# Patient Record
Sex: FEMALE | NOT HISPANIC OR LATINO | Employment: OTHER | URBAN - METROPOLITAN AREA
[De-identification: names, ages, dates, MRNs, and addresses within clinical notes are randomized per-mention and may not be internally consistent; named-entity substitution may affect disease eponyms.]

---

## 2024-04-23 ENCOUNTER — TRANSFERRED RECORDS (OUTPATIENT)
Dept: HEALTH INFORMATION MANAGEMENT | Facility: CLINIC | Age: 74
End: 2024-04-23

## 2024-05-10 ENCOUNTER — TRANSFERRED RECORDS (OUTPATIENT)
Dept: HEALTH INFORMATION MANAGEMENT | Facility: CLINIC | Age: 74
End: 2024-05-10

## 2024-05-13 ENCOUNTER — TRANSFERRED RECORDS (OUTPATIENT)
Dept: HEALTH INFORMATION MANAGEMENT | Facility: CLINIC | Age: 74
End: 2024-05-13

## 2024-07-16 ENCOUNTER — TRANSFERRED RECORDS (OUTPATIENT)
Dept: HEALTH INFORMATION MANAGEMENT | Facility: CLINIC | Age: 74
End: 2024-07-16

## 2024-07-22 ENCOUNTER — TELEPHONE (OUTPATIENT)
Dept: OPHTHALMOLOGY | Facility: CLINIC | Age: 74
End: 2024-07-22

## 2024-07-22 NOTE — TELEPHONE ENCOUNTER
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 Provided appointment details over the phone.

## 2024-07-23 ENCOUNTER — PRE VISIT (OUTPATIENT)
Dept: OPHTHALMOLOGY | Facility: CLINIC | Age: 74
End: 2024-07-23

## 2024-07-23 ENCOUNTER — OFFICE VISIT (OUTPATIENT)
Dept: OPHTHALMOLOGY | Facility: CLINIC | Age: 74
End: 2024-07-23

## 2024-07-23 DIAGNOSIS — C69.31 CHOROID MELANOMA OF RIGHT EYE (H): Primary | ICD-10-CM

## 2024-07-23 PROCEDURE — 99204 OFFICE O/P NEW MOD 45 MIN: CPT | Mod: GC | Performed by: OPHTHALMOLOGY

## 2024-07-23 RX ORDER — LEVOTHYROXINE SODIUM 112 UG/1
1 TABLET ORAL
COMMUNITY
Start: 2024-04-17

## 2024-07-23 ASSESSMENT — TONOMETRY
OD_IOP_MMHG: 9
IOP_METHOD: ICARE
OS_IOP_MMHG: 11

## 2024-07-23 ASSESSMENT — CONF VISUAL FIELD
OD_SUPERIOR_NASAL_RESTRICTION: 3
OS_SUPERIOR_TEMPORAL_RESTRICTION: 0
OD_INFERIOR_NASAL_RESTRICTION: 3
OS_INFERIOR_NASAL_RESTRICTION: 0
OS_SUPERIOR_NASAL_RESTRICTION: 0
OS_NORMAL: 1
METHOD: COUNTING FINGERS
OS_INFERIOR_TEMPORAL_RESTRICTION: 0

## 2024-07-23 ASSESSMENT — VISUAL ACUITY
OD_CC: 20/40
OS_CC: 20/50
METHOD: SNELLEN - LINEAR
OD_CC+: -2

## 2024-07-23 ASSESSMENT — EXTERNAL EXAM - LEFT EYE: OS_EXAM: NORMAL

## 2024-07-23 ASSESSMENT — SLIT LAMP EXAM - LIDS
COMMENTS: NORMAL
COMMENTS: NORMAL

## 2024-07-23 ASSESSMENT — EXTERNAL EXAM - RIGHT EYE: OD_EXAM: NORMAL

## 2024-07-23 NOTE — TELEPHONE ENCOUNTER
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 Per email from referring provider    RECORDS REQUESTED FROM:       Clinic name Comments Records Status Imaging Status   [facility] Urgent request for recs sent 7/23- received emailed to nurse/sent to scanning EPIC

## 2024-07-23 NOTE — LETTER
2024         RE:  :  MRN: Shira Abernathy  1950  [id number]     Dear Dr. Shane Aj,    Thank you for asking me to see your patient, Shira Abernathy, for an oculoplastic   consultation.  My assessment and plan are below.  For further details, please see my attached clinic note.    Oculoplastic Clinic New Patient     Patient: Shira Abernathy MRN# [id number]   YOB: 1950 Age: 73 year old   Date of Visit: 2024     CC: Blind painful eye     Chief Complaint(s) and History of Present Illness(es)     Malignant Melanoma (Choroid) Evaluation            Laterality: right eye    Duration: 1 month    Course: gradually worsening           Comments    Did not notice changes in vision in right eye.  Was seen for yearly eye   exam and was noted to have choroidal lesion concerning for melanoma.  Has   water circles that flash in the left eye.  Denies eye pain.  No history of   cancer elsewhere.  History of monovision LASIK with distance right eye and   near left eye.       Rita Hare on 2024 at 1:39 PM             HPI:      Shira Abernathy is a 73 year old female who is referred for enucleation right eye for suspected choroidal melanoma in the right eye.   Vision is 20/40 in the right eye.            PAST OCULAR HISTORY:      Suspected choroidal melanoma in the right eye  ERM each eye   PVD each eye   Dry eye   LASIK each eye           Assessment and Plan:      1. Choroid melanoma of right eye (H)       Enucleation with placement of silicone implant RIGHT EYE for choroidal melanoma  Interested in [place] testing.     Patient has concerns about undergoing general anesthesia given prolonged time waking up from anesthesia and risks of cognitive impairment.     We discussed the typical postoperative care, including the need for a patch for 4-6 days. After this she will have a clear conformer in. About 8 weeks later I will see her back and she can schedule an appointment with  the  to make a prosthesis any time after that.      ANTICOAGULATION:  None     Can hold prior to surgery. Enucleation with silicone implant.   Sylvia Drake,  Oculoplastic Surgery Fellow       Again, thank you for allowing me to participate in the care of your patient.      Sincerely,    Shaggy Aguilar MD  Department of Ophthalmology and Visual Neurosciences  [facility]    CC: Shane Aj MD  [facility]  [address] [place] [address] [address]  [facility] [address]  Via In Basket

## 2024-07-23 NOTE — PROGRESS NOTES
"Oculoplastic Clinic New Patient    Patient: Shira Abernathy MRN# [id number]   YOB: 1950 Age: 73 year old   Date of Visit: Jul 23, 2024    CC: Blind painful eye    Chief Complaint(s) and History of Present Illness(es)     Malignant Melanoma (Choroid) Evaluation            Laterality: right eye    Duration: 1 month    Course: gradually worsening           Comments    Did not notice changes in vision in right eye.  Was seen for yearly eye   exam and was noted to have choroidal lesion concerning for melanoma.  Has   water circles that flash in the left eye.  Denies eye pain.  No history of   cancer elsewhere.  History of monovision LASIK with distance right eye and   near left eye.      Rita Hare on 7/23/2024 at 1:39 PM             HPI:     Shira Abernathy is a 73 year old female who is referred for enucleation right eye for suspected choroidal melanoma in the right eye.   Vision is 20/40 in the right eye.            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 Choroid melanoma of right eye (H)      Enucleation with placement of silicone implant RIGHT EYE for choroidal melanoma  Interested in [place] testing.    Patient has concerns about undergoing general anesthesia given prolonged time waking up from anesthesia and risks of cognitive impairment.    We discussed the typical postoperative care, including the need for a patch for 4-6 days. After this she will have a clear conformer in. About 8 weeks later I will see her back and she can schedule an appointment with the  to make a prosthesis any time after that.     We discussed she will need to see oncology locally ([place]).   She has had imaging earlier including Chest/Abdomen CT. I don't have those reports, but they report it was \"clean.\" They do understand even despite this there is a risk of metastasis " even with enucleation.        ANTICOAGULATION:  None    Can hold prior to surgery. Enucleation with silicone implant.      Sylvia Drake,  Oculoplastic Surgery Fellow    Attending Physician Attestation: Complete documentation of historical and exam elements from today's encounter can be found in the full encounter summary report (not reduplicated in this progress note). I personally obtained the chief complaint(s) and history of present illness. I confirmed and edited as necessary the review of systems, past medical/surgical history, family history, social history, and examination findings as documented by others; and I examined the patient myself. I personally reviewed the relevant tests, images, and reports as documented above. I formulated and edited as necessary the assessment and plan and discussed the findings and management plan with the patient. Shaggy Aguilar MD    Today with Shira Abernathy I reviewed the indications, risks, benefits, and alternatives of the proposed surgical procedure including, but not limited to, failure obtain the desired result and need for additional surgery, bleeding, infection, and the remote possibility of permanent damage to any organ system or death with the use of anesthesia. With implants, there is always a risk of implant related complications including exposure, extrusion, infection, and need for more surgery or medications. I provided multiple opportunities for the questions, answered all questions to the best of my ability, and confirmed that my answers and my discussion were understood.

## 2024-07-23 NOTE — NURSING NOTE
Chief Complaints and History of Present Illnesses   Patient presents with    Malignant Melanoma (Choroid) Evaluation     Chief Complaint(s) and History of Present Illness(es)       Malignant Melanoma (Choroid) Evaluation              Laterality: right eye    Duration: 1 month    Course: gradually worsening              Comments    Did not notice changes in vision in right eye.  Was seen for yearly eye exam and was noted to have choroidal lesion concerning for melanoma.  Has water circles that flash in the left eye.  Denies eye pain.  No history of cancer elsewhere.  History of monovision LASIK with distance right eye and near left eye.      Rita Hare on 7/23/2024 at 1:39 PM

## 2024-07-25 ENCOUNTER — TELEPHONE (OUTPATIENT)
Dept: OPHTHALMOLOGY | Facility: CLINIC | Age: 74
End: 2024-07-25

## 2024-07-25 NOTE — TELEPHONE ENCOUNTER
Message left for the patient to call back to get surgery scheduled with Dr. Aguilar.     Divina Marks  Surgery Scheduling Coordinator  Ph: [phone]

## 2024-07-26 NOTE — TELEPHONE ENCOUNTER
Called patient to schedule surgery with Dr. Aguilar    Spoke with: Romaine ()    Date of Surgery: 8-2-24    Patient aware of approximate arrival time: No at pt to get a call a couple of days prior to surgery     Location of surgery: [facility] OR      Pre-Op H&P: Primary Care Clinic at [facility] or [facility]?     Informed patient that they need to call to schedule pre-op H&P with PCP within 30 days of surgery date: Yes      Post-Op Appt Dates: 8-7-24 11:00am [place]       Discussed with patient pre-op RN will call 2-3 days prior to surgery with arrival time and instructions:  Yes       Standard Surgery Packet Sent: Yes 07/26/24  via Mail - Standard      Surgical Soap Discussed with Patient: Yes  - Received:  Local Pharmacy       Additional Information Sent in Packet:          Informed patient that they will need an adult  to bring patient home from surgery: Yes  : - Romaine         Additional Comments:        All patients questions were answered and was instructed to review surgical packet and call back [phone] with any questions or concerns.       Divina Marks on 7/26/2024 at 11:05 AM

## 2024-07-31 ENCOUNTER — TRANSFERRED RECORDS (OUTPATIENT)
Dept: HEALTH INFORMATION MANAGEMENT | Facility: CLINIC | Age: 74
End: 2024-07-31

## 2024-07-31 ENCOUNTER — TELEPHONE (OUTPATIENT)
Dept: OPHTHALMOLOGY | Facility: CLINIC | Age: 74
End: 2024-07-31

## 2024-07-31 ENCOUNTER — TRANSFERRED RECORDS (OUTPATIENT)
Dept: MULTI SPECIALTY CLINIC | Facility: CLINIC | Age: 74
End: 2024-07-31

## 2024-07-31 LAB
CREATININE (EXTERNAL): 0.79 MG/DL (ref 0.7–1.2)
GFR ESTIMATED (EXTERNAL): 78 ML/MIN/1.73M2
GLUCOSE (EXTERNAL): 81 MG/DL (ref 70–100)
POTASSIUM (EXTERNAL): 4.3 MMOL/L (ref 3.5–5.1)

## 2024-07-31 NOTE — TELEPHONE ENCOUNTER
LVM with [facility] that [facility] Care form has been sent and they can either fax or email it back.  Left my number in case there are any questions.    Reinaldo Vital  Clinic Manager

## 2024-08-02 ENCOUNTER — ANESTHESIA (OUTPATIENT)
Dept: SURGERY | Facility: CLINIC | Age: 74
End: 2024-08-02

## 2024-08-02 ENCOUNTER — HOSPITAL ENCOUNTER (OUTPATIENT)
Facility: CLINIC | Age: 74
Discharge: HOME | End: 2024-08-02
Attending: OPHTHALMOLOGY | Admitting: OPHTHALMOLOGY

## 2024-08-02 ENCOUNTER — ANESTHESIA EVENT (OUTPATIENT)
Dept: SURGERY | Facility: CLINIC | Age: 74
End: 2024-08-02

## 2024-08-02 ENCOUNTER — TRANSFERRED RECORDS (OUTPATIENT)
Dept: HEALTH INFORMATION MANAGEMENT | Facility: CLINIC | Age: 74
End: 2024-08-02

## 2024-08-02 VITALS
DIASTOLIC BLOOD PRESSURE: 73 MMHG | RESPIRATION RATE: 16 BRPM | HEIGHT: 66 IN | BODY MASS INDEX: 22.16 KG/M2 | WEIGHT: 137.9 LBS | OXYGEN SATURATION: 96 % | TEMPERATURE: 97.2 F | SYSTOLIC BLOOD PRESSURE: 122 MMHG | HEART RATE: 63 BPM

## 2024-08-02 DIAGNOSIS — Z98.890 POSTOPERATIVE EYE STATE: Primary | ICD-10-CM

## 2024-08-02 PROCEDURE — 710N000009 HC RECOVERY PHASE 1, LEVEL 1, PER MIN: Performed by: OPHTHALMOLOGY

## 2024-08-02 PROCEDURE — 258N000003 HC RX IP 258 OP 636: Performed by: ANESTHESIOLOGY

## 2024-08-02 PROCEDURE — 250N000009 HC RX 250: Performed by: ANESTHESIOLOGY

## 2024-08-02 PROCEDURE — 65105 REMOVE EYE/ATTACH IMPLANT: CPT | Mod: RT | Performed by: OPHTHALMOLOGY

## 2024-08-02 PROCEDURE — 360N000076 HC SURGERY LEVEL 3, PER MIN: Performed by: OPHTHALMOLOGY

## 2024-08-02 PROCEDURE — 250N000009 HC RX 250: Performed by: OPHTHALMOLOGY

## 2024-08-02 PROCEDURE — 250N000013 HC RX MED GY IP 250 OP 250 PS 637: Performed by: OPHTHALMOLOGY

## 2024-08-02 PROCEDURE — 88313 SPECIAL STAINS GROUP 2: CPT | Mod: 26 | Performed by: OPHTHALMOLOGY

## 2024-08-02 PROCEDURE — 258N000003 HC RX IP 258 OP 636: Performed by: OPHTHALMOLOGY

## 2024-08-02 PROCEDURE — 65105 REMOVE EYE/ATTACH IMPLANT: CPT | Performed by: ANESTHESIOLOGY

## 2024-08-02 PROCEDURE — 272N000001 HC OR GENERAL SUPPLY STERILE: Performed by: OPHTHALMOLOGY

## 2024-08-02 PROCEDURE — 250N000011 HC RX IP 250 OP 636

## 2024-08-02 PROCEDURE — 710N000012 HC RECOVERY PHASE 2, PER MINUTE: Performed by: OPHTHALMOLOGY

## 2024-08-02 PROCEDURE — 999N000141 HC STATISTIC PRE-PROCEDURE NURSING ASSESSMENT: Performed by: OPHTHALMOLOGY

## 2024-08-02 PROCEDURE — 99100 ANES PT EXTEME AGE<1 YR&>70: CPT

## 2024-08-02 PROCEDURE — 250N000011 HC RX IP 250 OP 636: Performed by: OPHTHALMOLOGY

## 2024-08-02 PROCEDURE — 250N000025 HC SEVOFLURANE, PER MIN: Performed by: OPHTHALMOLOGY

## 2024-08-02 PROCEDURE — 67875 CLOSURE OF EYELID BY SUTURE: CPT | Mod: RT | Performed by: OPHTHALMOLOGY

## 2024-08-02 PROCEDURE — L8610 OCULAR IMPLANT: HCPCS | Performed by: OPHTHALMOLOGY

## 2024-08-02 PROCEDURE — 250N000011 HC RX IP 250 OP 636: Performed by: ANESTHESIOLOGY

## 2024-08-02 PROCEDURE — 65105 REMOVE EYE/ATTACH IMPLANT: CPT

## 2024-08-02 PROCEDURE — 88307 TISSUE EXAM BY PATHOLOGIST: CPT | Mod: 26 | Performed by: OPHTHALMOLOGY

## 2024-08-02 PROCEDURE — 88313 SPECIAL STAINS GROUP 2: CPT | Mod: TC | Performed by: OPHTHALMOLOGY

## 2024-08-02 PROCEDURE — 370N000017 HC ANESTHESIA TECHNICAL FEE, PER MIN: Performed by: OPHTHALMOLOGY

## 2024-08-02 DEVICE — EYE IMP SPHERE SILICONE 22MM S6.1022U: Type: IMPLANTABLE DEVICE | Site: EYE | Status: FUNCTIONAL

## 2024-08-02 RX ORDER — ONDANSETRON 4 MG/1
4 TABLET, ORALLY DISINTEGRATING ORAL EVERY 30 MIN PRN
Status: DISCONTINUED | OUTPATIENT
Start: 2024-08-02 | End: 2024-08-02 | Stop reason: HOSPADM

## 2024-08-02 RX ORDER — FENTANYL CITRATE 50 UG/ML
INJECTION, SOLUTION INTRAMUSCULAR; INTRAVENOUS PRN
Status: DISCONTINUED | OUTPATIENT
Start: 2024-08-02 | End: 2024-08-02

## 2024-08-02 RX ORDER — SODIUM CHLORIDE, SODIUM LACTATE, POTASSIUM CHLORIDE, CALCIUM CHLORIDE 600; 310; 30; 20 MG/100ML; MG/100ML; MG/100ML; MG/100ML
INJECTION, SOLUTION INTRAVENOUS CONTINUOUS
Status: DISCONTINUED | OUTPATIENT
Start: 2024-08-02 | End: 2024-08-02 | Stop reason: HOSPADM

## 2024-08-02 RX ORDER — OXYCODONE HYDROCHLORIDE 5 MG/1
5 TABLET ORAL ONCE
Status: COMPLETED | OUTPATIENT
Start: 2024-08-02 | End: 2024-08-02

## 2024-08-02 RX ORDER — LIDOCAINE HYDROCHLORIDE 20 MG/ML
INJECTION, SOLUTION INFILTRATION; PERINEURAL PRN
Status: DISCONTINUED | OUTPATIENT
Start: 2024-08-02 | End: 2024-08-02

## 2024-08-02 RX ORDER — DEXAMETHASONE SODIUM PHOSPHATE 4 MG/ML
4 INJECTION, SOLUTION INTRA-ARTICULAR; INTRALESIONAL; INTRAMUSCULAR; INTRAVENOUS; SOFT TISSUE
Status: DISCONTINUED | OUTPATIENT
Start: 2024-08-02 | End: 2024-08-02 | Stop reason: HOSPADM

## 2024-08-02 RX ORDER — ONDANSETRON 2 MG/ML
INJECTION INTRAMUSCULAR; INTRAVENOUS PRN
Status: DISCONTINUED | OUTPATIENT
Start: 2024-08-02 | End: 2024-08-02

## 2024-08-02 RX ORDER — CEFAZOLIN SODIUM/WATER 2 G/20 ML
2 SYRINGE (ML) INTRAVENOUS SEE ADMIN INSTRUCTIONS
Status: DISCONTINUED | OUTPATIENT
Start: 2024-08-02 | End: 2024-08-02 | Stop reason: HOSPADM

## 2024-08-02 RX ORDER — ERYTHROMYCIN 5 MG/G
OINTMENT OPHTHALMIC PRN
Status: DISCONTINUED | OUTPATIENT
Start: 2024-08-02 | End: 2024-08-02 | Stop reason: HOSPADM

## 2024-08-02 RX ORDER — EPHEDRINE SULFATE 50 MG/ML
INJECTION, SOLUTION INTRAMUSCULAR; INTRAVENOUS; SUBCUTANEOUS PRN
Status: DISCONTINUED | OUTPATIENT
Start: 2024-08-02 | End: 2024-08-02

## 2024-08-02 RX ORDER — LIDOCAINE HCL/EPINEPHRINE/PF 2%-1:200K
VIAL (ML) INJECTION
Status: DISCONTINUED
Start: 2024-08-02 | End: 2024-08-02 | Stop reason: HOSPADM

## 2024-08-02 RX ORDER — GLYCOPYRROLATE 0.2 MG/ML
INJECTION, SOLUTION INTRAMUSCULAR; INTRAVENOUS PRN
Status: DISCONTINUED | OUTPATIENT
Start: 2024-08-02 | End: 2024-08-02

## 2024-08-02 RX ORDER — PROPOFOL 10 MG/ML
INJECTION, EMULSION INTRAVENOUS PRN
Status: DISCONTINUED | OUTPATIENT
Start: 2024-08-02 | End: 2024-08-02

## 2024-08-02 RX ORDER — HYDROMORPHONE HCL IN WATER/PF 6 MG/30 ML
0.4 PATIENT CONTROLLED ANALGESIA SYRINGE INTRAVENOUS EVERY 5 MIN PRN
Status: DISCONTINUED | OUTPATIENT
Start: 2024-08-02 | End: 2024-08-02 | Stop reason: HOSPADM

## 2024-08-02 RX ORDER — FENTANYL CITRATE 0.05 MG/ML
25 INJECTION, SOLUTION INTRAMUSCULAR; INTRAVENOUS EVERY 5 MIN PRN
Status: DISCONTINUED | OUTPATIENT
Start: 2024-08-02 | End: 2024-08-02 | Stop reason: HOSPADM

## 2024-08-02 RX ORDER — NALOXONE HYDROCHLORIDE 0.4 MG/ML
0.1 INJECTION, SOLUTION INTRAMUSCULAR; INTRAVENOUS; SUBCUTANEOUS
Status: DISCONTINUED | OUTPATIENT
Start: 2024-08-02 | End: 2024-08-02 | Stop reason: HOSPADM

## 2024-08-02 RX ORDER — DOXYCYCLINE 100 MG/1
100 CAPSULE ORAL 2 TIMES DAILY
Qty: 14 CAPSULE | Refills: 0 | Status: SHIPPED | OUTPATIENT
Start: 2024-08-02 | End: 2024-08-09

## 2024-08-02 RX ORDER — MAGNESIUM HYDROXIDE 1200 MG/15ML
LIQUID ORAL PRN
Status: DISCONTINUED | OUTPATIENT
Start: 2024-08-02 | End: 2024-08-02 | Stop reason: HOSPADM

## 2024-08-02 RX ORDER — TRANEXAMIC ACID 10 MG/ML
1 INJECTION, SOLUTION INTRAVENOUS ONCE
Status: DISCONTINUED | OUTPATIENT
Start: 2024-08-02 | End: 2024-08-02 | Stop reason: HOSPADM

## 2024-08-02 RX ORDER — NEOMYCIN SULFATE, POLYMYXIN B SULFATE, AND DEXAMETHASONE 3.5; 10000; 1 MG/G; [USP'U]/G; MG/G
OINTMENT OPHTHALMIC
Qty: 3.5 G | Refills: 1 | Status: SHIPPED | OUTPATIENT
Start: 2024-08-02 | End: 2024-10-02

## 2024-08-02 RX ORDER — HYDROMORPHONE HCL IN WATER/PF 6 MG/30 ML
0.2 PATIENT CONTROLLED ANALGESIA SYRINGE INTRAVENOUS EVERY 5 MIN PRN
Status: DISCONTINUED | OUTPATIENT
Start: 2024-08-02 | End: 2024-08-02 | Stop reason: HOSPADM

## 2024-08-02 RX ORDER — FENTANYL CITRATE 0.05 MG/ML
50 INJECTION, SOLUTION INTRAMUSCULAR; INTRAVENOUS EVERY 5 MIN PRN
Status: DISCONTINUED | OUTPATIENT
Start: 2024-08-02 | End: 2024-08-02 | Stop reason: HOSPADM

## 2024-08-02 RX ORDER — SODIUM CHLORIDE, SODIUM LACTATE, POTASSIUM CHLORIDE, CALCIUM CHLORIDE 600; 310; 30; 20 MG/100ML; MG/100ML; MG/100ML; MG/100ML
INJECTION, SOLUTION INTRAVENOUS CONTINUOUS PRN
Status: DISCONTINUED | OUTPATIENT
Start: 2024-08-02 | End: 2024-08-02

## 2024-08-02 RX ORDER — ONDANSETRON 2 MG/ML
4 INJECTION INTRAMUSCULAR; INTRAVENOUS EVERY 30 MIN PRN
Status: DISCONTINUED | OUTPATIENT
Start: 2024-08-02 | End: 2024-08-02 | Stop reason: HOSPADM

## 2024-08-02 RX ORDER — DEXAMETHASONE SODIUM PHOSPHATE 4 MG/ML
INJECTION, SOLUTION INTRA-ARTICULAR; INTRALESIONAL; INTRAMUSCULAR; INTRAVENOUS; SOFT TISSUE PRN
Status: DISCONTINUED | OUTPATIENT
Start: 2024-08-02 | End: 2024-08-02

## 2024-08-02 RX ORDER — ERYTHROMYCIN 5 MG/G
OINTMENT OPHTHALMIC
Status: DISCONTINUED
Start: 2024-08-02 | End: 2024-08-02 | Stop reason: HOSPADM

## 2024-08-02 RX ORDER — CEFAZOLIN SODIUM/WATER 2 G/20 ML
2 SYRINGE (ML) INTRAVENOUS
Status: COMPLETED | OUTPATIENT
Start: 2024-08-02 | End: 2024-08-02

## 2024-08-02 RX ORDER — BUPIVACAINE HYDROCHLORIDE 5 MG/ML
INJECTION, SOLUTION EPIDURAL; INTRACAUDAL
Status: DISCONTINUED
Start: 2024-08-02 | End: 2024-08-02 | Stop reason: HOSPADM

## 2024-08-02 RX ORDER — OXYCODONE HYDROCHLORIDE 5 MG/1
5 TABLET ORAL EVERY 6 HOURS PRN
Qty: 12 TABLET | Refills: 0 | Status: SHIPPED | OUTPATIENT
Start: 2024-08-02 | End: 2024-08-05

## 2024-08-02 RX ADMIN — LIDOCAINE HYDROCHLORIDE 100 MG: 20 INJECTION, SOLUTION INFILTRATION; PERINEURAL at 10:57

## 2024-08-02 RX ADMIN — DEXAMETHASONE SODIUM PHOSPHATE 4 MG: 4 INJECTION, SOLUTION INTRA-ARTICULAR; INTRALESIONAL; INTRAMUSCULAR; INTRAVENOUS; SOFT TISSUE at 11:07

## 2024-08-02 RX ADMIN — OXYCODONE HYDROCHLORIDE 5 MG: 5 TABLET ORAL at 13:30

## 2024-08-02 RX ADMIN — Medication 2 G: at 10:54

## 2024-08-02 RX ADMIN — ONDANSETRON 4 MG: 2 INJECTION INTRAMUSCULAR; INTRAVENOUS at 11:07

## 2024-08-02 RX ADMIN — SODIUM CHLORIDE, POTASSIUM CHLORIDE, SODIUM LACTATE AND CALCIUM CHLORIDE: 600; 310; 30; 20 INJECTION, SOLUTION INTRAVENOUS at 10:54

## 2024-08-02 RX ADMIN — PROPOFOL 150 MG: 10 INJECTION, EMULSION INTRAVENOUS at 10:57

## 2024-08-02 RX ADMIN — GLYCOPYRROLATE 0.2 MG: 0.2 INJECTION, SOLUTION INTRAMUSCULAR; INTRAVENOUS at 11:29

## 2024-08-02 RX ADMIN — TRANEXAMIC ACID 1 G: 1 INJECTION, SOLUTION INTRAVENOUS at 11:04

## 2024-08-02 RX ADMIN — Medication 10 MG: at 11:10

## 2024-08-02 RX ADMIN — Medication 5 MG: at 11:06

## 2024-08-02 RX ADMIN — FENTANYL CITRATE 100 MCG: 50 INJECTION INTRAMUSCULAR; INTRAVENOUS at 10:57

## 2024-08-02 RX ADMIN — PHENYLEPHRINE HYDROCHLORIDE 0.2 MCG/KG/MIN: 10 INJECTION INTRAVENOUS at 11:06

## 2024-08-02 RX ADMIN — SODIUM CHLORIDE, POTASSIUM CHLORIDE, SODIUM LACTATE AND CALCIUM CHLORIDE: 600; 310; 30; 20 INJECTION, SOLUTION INTRAVENOUS at 10:14

## 2024-08-02 ASSESSMENT — ACTIVITIES OF DAILY LIVING (ADL)
ADLS_ACUITY_SCORE: 35

## 2024-08-02 ASSESSMENT — LIFESTYLE VARIABLES: TOBACCO_USE: 0

## 2024-08-02 NOTE — OP NOTE
PREOPERATIVE DIAGNOSIS: Presumed choroidal melanoma right eye   POSTOPERATIVE DIAGNOSIS: Presumed choroidal melanoma right eye  PROCEDURE: Right eye enucleation with placement of a 22 mm silicone implant with the muscles attached. Temporary tarsorrhaphy.   SURGEON: Shaggy Aguilar MD   ASSISTANT: Sylvia Drake MD  ANESTHESIA: General anesthesia.   COMPLICATIONS: None.   SPECIMEN:Right  eye in formalin for pathologic evaluation.   ESTIMATED BLOOD LOSS: Less than 10 mL.   HISTORY AND INDICATIONS: Shira Abernathy  presented with a presumed choroidal melanoma of the right eye. After the risks, benefits and alternatives to the proposed procedure were explained, informed consent was obtained.   DESCRIPTION OF PROCEDURE: Shira Abernathy  was brought to the operating room and placed supine on the operating table. General anesthesia was induced. The right  eye was prepped and draped in the typical sterile ophthalmic fashion. Attention was directed to the right  side. A Stewart lid speculum was placed to separate the eyelids. A 360-degree conjunctival peritomy was performed with the [place] scissors. Dissection was carried into the quadrants with the Balderrama tenotomy scissors. Each rectus muscle was hooked with the Yoav muscle hook,  tagged with a double-armed 5-0 Prolene suture and cut free from the globe. The inferior oblique muscle was identified, cauterized and cut free from the globe. The optic nerve was clamped with a hemostat and severed with the enucleation scissors. The hemostasis was obtained with pressure and bipolar cautery. The implant sizer spheres were used to determine the correct implant size and a 22 mm implant was selected. The  implant was placed in the antibiotic solution and then the incisions were made in the posterior portion of the sclera. The implant was placed in the socket using the Florin sphere introducer. The rectus muscles were sutured to the implant. Tenon's layer was closed with  interrupted buried 5-0 Monocryl sutures. The conjunctiva was closed with running 6-0 plain gut suture. A temporary tarsorrhaphy was fashioned with a 5-0 Monocryl laterally. Antibiotic ointment, a conformer and a pressure patch were placed. The patient tolerated the procedure well and  left the operating room in stable condition after being awoken from general anesthesia.   Shaggy Aguilar MD

## 2024-08-02 NOTE — ANESTHESIA PROCEDURE NOTES
Airway       Patient location during procedure: OR  Staff -        Anesthesiologist:  Samson Lamb MD       CRNA: Marilin Celis APRN CRNA       Other Anesthesia Staff: Walt Bocanegra       Performed By: TOM and with CRNAs       Procedure performed by resident/fellow/CRNA in presence of a teaching physician.    Consent for Airway        Urgency: elective  Indications and Patient Condition       Indications for airway management: etelvina-procedural       Induction type:intravenous       Mask difficulty assessment: 0 - not attempted    Final Airway Details       Final airway type: supraglottic airway    Supraglottic Airway Details        Type: LMA       Brand: I-Gel       LMA size: 4    Post intubation assessment        Placement verified by: capnometry, equal breath sounds and chest rise        Number of attempts at approach: 1       Number of other approaches attempted: 0       Secured with: tape       Ease of procedure: easy       Dentition: Intact and Unchanged

## 2024-08-02 NOTE — DISCHARGE INSTRUCTIONS
Post-operative Instructions - Enucleation and Evisceration   Ophthalmic Plastic and Reconstructive Surgery    Shaggy Aguilar M.D.    All instructions apply to the operated eye(s) or eyelid(s).    Wound care and personal care  ? Leave the dressing in place until seen in clinic. Ensure that the bandage does not get wet when you take a shower.  ? Warm soaks or warm packs should be used over the operated side four times daily after the dressing has been removed.    ? You may shower the day after surgery but do not get the dressing wet. Do not bathe for 1 week to prevent contamination of your wounds. Do not submerge your head in water (swimming, in a tub) for six weeks.  ? Do not apply make-up to the eyes or eyelids for 2 weeks after surgery.  ? Expect some swelling, bruising, black eye (even into the lower eyelids and cheeks). Also expect serum caking, crusting and discharge from the eye and/or incisions. A small amount of surface bleeding is normal for the first 48 hours.  ? Your eye(s) and eyelid(s) may be painful and tender. This is normal after surgery. You will have pain with eye movements.   Use the pain medication as prescribed.    Follow-up  ? Return to the Eye Clinic for a follow-up appointment with your physician as  scheduled. If no appointment has been scheduled, you should be seen within 4-7 days to have the dressing removed.    Activity restrictions and driving  ? Avoid heavy lifting (over 15 pounds), bending, exercise or strenuous activity for 1 week after surgery.  You may resume other activities and return to work as tolerated.  ? You may not resume driving until have you stopped using narcotic pain medications (such as Norco, Oxycodone, Percocet, Tylenol #3).    Medications  ? Restart all your regular home medications. If you were using eye drops in the operated eye, you can stop those. If you take Plavix or Aspirin on a regular basis, wait for 72 hours after your surgery before restarting these in  order to decrease the risk of bleeding complications.  ? Avoid aspirin and aspirin-like medications (Motrin, Aleve, Ibuprofen, [place] etc) for 72 hours to reduce the risk of bleeding. You may take Tylenol  (acetaminophen) for pain.  ? In addition to your home medications, take the following post-operative medications as prescribed by your physician.    ? Take antibiotic capsules or tablets as directed.  ? Take pain pills at prescribed frequency as needed for pain.  ? Once your dressing has been removed, you should apply the prescribed antibiotic ointment to the operated eye socket three times a day.    ? WARNING: Some prescription pain medications contain Tylenol (acetaminophen). You must not take more than 4,000 mg of acetaminophen per  24-hour period. This is equivalent to 12 tablets of Norco, Percocet or Tylenol #3. If you take other over-the-counter medications containing acetaminophen, you must take the amount of acetaminophen into account and reduce the number of prescribed pain pills accordingly.  ? The prescribed medications may make you drowsy. You must not drive a car, operate heavy machinery or drink alcohol while taking them.  ? The prescribed pain medications may cause constipation and nausea. Take them with some food to prevent a stomach upset. If you continue to experience nausea, call your physician.    Contact Information:  If you have any problems, concerns, or need to schedule follow up please call the clinic:    If your clinic appointments (regardless of where surgery was performed) are at the [facility]: [phone]     An on call person can be reached after hours for concerns. The on call doctor should not call in medication refill requests after hours or on weekends, so please plan accordingly. An effort has been made to provide adequate pain medications following every surgery, and refills will not be provided in most instances.       Same Day Surgery Discharge  Instructions for  Sedation and General Anesthesia     It's not unusual to feel dizzy, light-headed or faint for up to 24 hours after surgery or while taking pain medication.  If you have these symptoms: sit for a few minutes before standing and have someone assist you when you get up to walk or use the bathroom.    You should rest and relax for the next 24 hours. We recommend you make arrangements to have an adult stay with you for at least 24 hours after your discharge.  Avoid hazardous and strenuous activity.    DO NOT DRIVE any vehicle or operate mechanical equipment for 24 hours following the end of your surgery.  Even though you may feel normal, your reactions may be affected by the medication you have received.    Do not drink alcoholic beverages for 24 hours following surgery.     Slowly progress to your regular diet as you feel able. It's not unusual to feel nauseated and/or vomit after receiving anesthesia.  If you develop these symptoms, drink clear liquids (apple juice, ginger ale, broth, 7-up, etc. ) until you feel better.  If your nausea and vomiting persists for 24 hours, please notify your surgeon.      All narcotic pain medications, along with inactivity and anesthesia, can cause constipation. Drinking plenty of liquids and increasing fiber intake will help.    For any questions of a medical nature, call your surgeon.    Do not make important decisions for 24 hours.    If you had general anesthesia, you may have a sore throat for a couple of days related to the breathing tube used during surgery.  You may use Cepacol lozenges to help with this discomfort.  If it worsens or if you develop a fever, contact your surgeon.     If you feel your pain is not well managed with the pain medications prescribed by your surgeon, please contact your surgeon's office to let them know so they can address your concerns.

## 2024-08-02 NOTE — BRIEF OP NOTE
[facility]    Brief Operative Note    Pre-operative diagnosis: Choroid melanoma of right eye (H) [C69.31]  Post-operative diagnosis Same as pre-operative diagnosis    Procedure: Right eye enucleation with implant, Right - Eye    Surgeon: Surgeons and Role:     * Shaggy Aguilar MD - Primary  Anesthesia: General   Estimated Blood Loss: Minimal    Drains: None  Specimens:   ID Type Source Tests Collected by Time Destination   1 : right eye Tissue Eye, Right SURGICAL PATHOLOGY EXAM Shaggy Aguilar MD 8/2/2024 11:51 AM      Findings:   None.  Complications: None.  Implants:   Implant Name Type Inv. Item Serial No.  Lot No. LRB No. Used Action   EYE IMP SPHERE SILICONE 22MM [id number] - [id number] Lens/Eye Implant EYE IMP SPHERE SILICONE 22MM [id number] [employer]  Right 1 Implanted

## 2024-08-02 NOTE — ANESTHESIA POSTPROCEDURE EVALUATION
Patient: Shira Abernathy    Procedure: Procedure(s):  Right eye enucleation with implant       Anesthesia Type:  General    Note:  Disposition: Outpatient   Postop Pain Control: Uneventful            Sign Out: Well controlled pain   PONV: No   Neuro/Psych: Uneventful            Sign Out: Acceptable/Baseline neuro status   Airway/Respiratory: Uneventful            Sign Out: Acceptable/Baseline resp. status   CV/Hemodynamics: Uneventful            Sign Out: Acceptable CV status; No obvious hypovolemia; No obvious fluid overload   Other NRE: NONE   DID A NON-ROUTINE EVENT OCCUR?            Last vitals:  Vitals Value Taken Time   /76 08/02/24 1315   Temp 36.2  C (97.2  F) 08/02/24 1300   Pulse 72 08/02/24 1320   Resp 14 08/02/24 1320   SpO2 95 % 08/02/24 1320   Vitals shown include unfiled device data.    Electronically Signed By: Samson Lamb MD  August 2, 2024  2:42 PM

## 2024-08-02 NOTE — ANESTHESIA PREPROCEDURE EVALUATION
"Prior to Admission medications    Medication Sig Start Date End Date Taking? Authorizing Provider   levothyroxine (SYNTHROID/LEVOTHROID) 112 MCG tablet Take 1 tablet by mouth daily at 2 pm 24  Yes Reported, Patient     Current Facility-Administered Medications   Medication Dose Route Frequency Provider Last Rate Last Admin    ceFAZolin Sodium (ANCEF) injection 2 g  2 g Intravenous Pre-Op/Pre-procedure x 1 dose Lenin Stewart MD        ceFAZolin Sodium (ANCEF) injection 2 g  2 g Intravenous See Admin Instructions Lenin Stewart MD        lactated ringers infusion   Intravenous Continuous Samson Lamb MD 10 mL/hr at 24 1014 New Bag at 24 1014    sodium chloride (PF) 0.9% PF flush 3 mL  3 mL Intracatheter q1 min prn Samson Lamb MD        tranexamic acid 1 g in 100 mL NS IV bag (premix)  1 g Intravenous Once Lenin Stewart MD         Current Facility-Administered Medications   Medication Dose Route Frequency Provider Last Rate Last Admin    lactated ringers infusion   Intravenous Continuous Samson Lamb MD 10 mL/hr at 24 1014 New Bag at 24 1014     No results for input(s): \"ABO\", \"RH\" in the last 97349 hours.  No results for input(s): \"HCG\" in the last 71735 hours.  No results found for this or any previous visit (from the past 744 hour(s)). Anesthesia Pre-Procedure Evaluation    Patient: Shira Abernathy   MRN: [id number] : 1950        Procedure : Procedure(s):  Right eye enucleation with implant          Past Medical History:   Diagnosis Date    Celiac disease     Hypothyroidism     SVT (supraventricular tachycardia) (H24)       Past Surgical History:   Procedure Laterality Date    CARDIAC ELECTROPHYSIOLOGY MAPPING AND ABLATION      LASIK      TOOTH EXTRACTION      x 4      Allergies   Allergen Reactions    Amoxicillin Unknown    Gluten Meal      Celiac Disease      Social History     Tobacco Use    Smoking status: Never    Smokeless tobacco: Never   Substance Use Topics " "   Alcohol use: Not Currently      Wt Readings from Last 1 Encounters:   08/02/24 62.6 kg (137 lb 14.4 oz)        Anesthesia Evaluation   Pt has had prior anesthetic.     No history of anesthetic complications       ROS/MED HX  ENT/Pulmonary:    (-) tobacco use and sleep apnea   Neurologic:       Cardiovascular:  - neg cardiovascular ROS     METS/Exercise Tolerance:     Hematologic:       Musculoskeletal:       GI/Hepatic:    (-) GERD   Renal/Genitourinary:       Endo:     (+)          thyroid problem, hypothyroidism,           Psychiatric/Substance Use:       Infectious Disease:       Malignancy:   (+) Malignancy, History of Other.    Other:            Physical Exam    Airway        Mallampati: I    Neck ROM: full     Respiratory Devices and Support         Dental       (+) Minor Abnormalities - some fillings, tiny chips      Cardiovascular   cardiovascular exam normal          Pulmonary   pulmonary exam normal                OUTSIDE LABS:  CBC: No results found for: \"WBC\", \"HGB\", \"HCT\", \"PLT\"  BMP: No results found for: \"NA\", \"POTASSIUM\", \"CHLORIDE\", \"CO2\", \"BUN\", \"CR\", \"GLC\"  COAGS: No results found for: \"PTT\", \"INR\", \"FIBR\"  POC: No results found for: \"BGM\", \"HCG\", \"HCGS\"  HEPATIC: No results found for: \"ALBUMIN\", \"PROTTOTAL\", \"ALT\", \"AST\", \"GGT\", \"ALKPHOS\", \"BILITOTAL\", \"BILIDIRECT\", \"LUCHO\"  OTHER: No results found for: \"PH\", \"LACT\", \"A1C\", \"HOWARD\", \"PHOS\", \"MAG\", \"LIPASE\", \"AMYLASE\", \"TSH\", \"T4\", \"T3\", \"CRP\", \"SED\"    Anesthesia Plan    ASA Status:  2    NPO Status:  NPO Appropriate    Anesthesia Type: General.     - Airway: LMA   Induction: Intravenous.   Maintenance: Balanced.        Consents    Anesthesia Plan(s) and associated risks, benefits, and realistic alternatives discussed. Questions answered and patient/representative(s) expressed understanding.     - Discussed:     - Discussed with:  Patient            Postoperative Care    Pain management: IV analgesics.   PONV prophylaxis: Ondansetron (or other " 5HT-3)     Comments:               Samson Lamb MD    I have reviewed the pertinent notes and labs in the chart from the past 30 days and (re)examined the patient.  Any updates or changes from those notes are reflected in this note.

## 2024-08-02 NOTE — ANESTHESIA CARE TRANSFER NOTE
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    Anesthesia Type:   General     Note:    Oropharynx: oropharynx clear of all foreign objects and spontaneously breathing  Level of Consciousness: drowsy  Oxygen Supplementation: face mask  Level of Supplemental Oxygen (L/min / FiO2): 6  Independent Airway: airway patency satisfactory and stable  Dentition: dentition unchanged  Vital Signs Stable: post-procedure vital signs reviewed and stable  Report to RN Given: handoff report given  Patient transferred to: Phase II    Handoff Report: Identifed the Patient, Identified the Reponsible Provider, Reviewed the pertinent medical history, Discussed the surgical course, Reviewed Intra-OP anesthesia mangement and issues during anesthesia, Set expectations for post-procedure period and Allowed opportunity for questions and acknowledgement of understanding      Vitals:  Vitals Value Taken Time   /64    Temp     Pulse 74    Resp 16    SpO2 100        Electronically Signed By: ALYTON Suggs CRNA  August 2, 2024  12:21 PM

## 2024-08-03 ENCOUNTER — TELEPHONE (OUTPATIENT)
Dept: OPHTHALMOLOGY | Facility: CLINIC | Age: 74
End: 2024-08-03

## 2024-08-03 DIAGNOSIS — Z98.890 POST-OPERATIVE NAUSEA AND VOMITING: Primary | ICD-10-CM

## 2024-08-03 DIAGNOSIS — R11.2 POST-OPERATIVE NAUSEA AND VOMITING: Primary | ICD-10-CM

## 2024-08-03 RX ORDER — ONDANSETRON 4 MG/1
4 TABLET, ORALLY DISINTEGRATING ORAL EVERY 6 HOURS PRN
Qty: 30 TABLET | Refills: 1 | Status: SHIPPED | OUTPATIENT
Start: 2024-08-03

## 2024-08-03 NOTE — TELEPHONE ENCOUNTER
Spoke to  Romaine. She is post op day one from enucleation for choroidal melanoma. He states his wife has been nauseous since surgery. She has had some vomiting as well. She has a moderate headache. He denies fever or purulent drainage from wound. She took one oxycodone last night but not today.  I ordered Zofran 4mg q6h and guided them to contact the local emergency room if she appears clinically quite dehydrated for consideration of IV fluids. Return / ED precautions discussed and patient  voiced understanding.    Remberto Sandoval MD  PGY-2 Resident  [facility] Department of Ophthalmology

## 2024-08-07 ENCOUNTER — OFFICE VISIT (OUTPATIENT)
Dept: OPHTHALMOLOGY | Facility: CLINIC | Age: 74
End: 2024-08-07

## 2024-08-07 DIAGNOSIS — C69.31 CHOROID MELANOMA OF RIGHT EYE (H): Primary | ICD-10-CM

## 2024-08-07 PROCEDURE — 99024 POSTOP FOLLOW-UP VISIT: CPT | Performed by: OPHTHALMOLOGY

## 2024-08-07 NOTE — PATIENT INSTRUCTIONS
Please see oncology locally to establish care for melanoma.    Use the prescribed ophthalmic ointment three times daily.    Use warm compresses three times a day.     Set up an appointment with the  any time after you have seen me for a prosthesis.    Contact [facility]: [phone]  ([email or web address]):         [facility] Locations:    [facility] [place] ([place], [place])  [facility] Office Building  [address]  [facility], [address]  Office: [phone]  Toll Free: [phone]      [facility]  [address]  [facility], [address]  Office: [phone]  Toll Free: [phone]      [facility] [place], [place]  [facility]  [address], [address]  [place] [address]  Toll Free: [phone]

## 2024-08-07 NOTE — PROGRESS NOTES
Chief Complaint(s) and History of Present Illness(es)     Post Op (Ophthalmology) Right Eye            Laterality: right eye          Comments    S/P Right eye enucleation with implant 8/2/2024. Here for patch removal.            Shira Abernathy is status post Enculeation with placement of silicone implant.  of the right eye for choroidal melanoma  She is doing well.   The dressing was removed. She has minimal bruising, and the temporary tarsorrhaphy is intact.   Ointment should be applied 3 x daily, and the conformer left in place.  Warm compresses should be used.  Information about obtaining an appointment with an  was provided, and this can be scheduled at any point after the next postop appointment.     She does want [place] testing.     She was instructed to establish care with an oncologist, and she would like to do that locally.     Attending Physician Attestation: Complete documentation of historical and exam elements from today's encounter can be found in the full encounter summary report (not reduplicated in this progress note). I personally obtained the chief complaint(s) and history of present illness. I confirmed and edited as necessary the review of systems, past medical/surgical history, family history, social history, and examination findings as documented by others; and I examined the patient myself. I personally reviewed the relevant tests, images, and reports as documented above. I formulated and edited as necessary the assessment and plan and discussed the findings and management plan with the patient and family. I personally reviewed the ophthalmic test(s) associated with this encounter, agree with the interpretation(s) as documented by the resident/fellow, and have edited the corresponding report(s) as necessary. Shaggy Aguilar MD

## 2024-08-07 NOTE — NURSING NOTE
Chief Complaints and History of Present Illnesses   Patient presents with    Post Op (Ophthalmology) Right Eye     Chief Complaint(s) and History of Present Illness(es)       Post Op (Ophthalmology) Right Eye              Laterality: right eye              Comments    S/P Right eye enucleation with implant 8/2/2024. Here for patch removal.

## 2024-08-08 LAB
PATH REPORT.COMMENTS IMP SPEC: ABNORMAL
PATH REPORT.COMMENTS IMP SPEC: ABNORMAL
PATH REPORT.COMMENTS IMP SPEC: YES
PATH REPORT.FINAL DX SPEC: ABNORMAL
PATH REPORT.GROSS SPEC: ABNORMAL
PATH REPORT.MICROSCOPIC SPEC OTHER STN: ABNORMAL
PATH REPORT.RELEVANT HX SPEC: ABNORMAL
PATHOLOGY SYNOPTIC REPORT: ABNORMAL
PHOTO IMAGE: ABNORMAL

## 2024-10-02 ENCOUNTER — OFFICE VISIT (OUTPATIENT)
Dept: OPHTHALMOLOGY | Facility: CLINIC | Age: 74
End: 2024-10-02

## 2024-10-02 DIAGNOSIS — C69.31 CHOROID MELANOMA OF RIGHT EYE (H): Primary | ICD-10-CM

## 2024-10-02 PROCEDURE — 99024 POSTOP FOLLOW-UP VISIT: CPT | Performed by: OPHTHALMOLOGY

## 2024-10-02 ASSESSMENT — VISUAL ACUITY
OS_CC+: -2
OS_CC: 20/30
METHOD: SNELLEN - LINEAR
CORRECTION_TYPE: GLASSES

## 2024-10-02 ASSESSMENT — TONOMETRY
OS_IOP_MMHG: 9
IOP_METHOD: ICARE

## 2024-10-02 ASSESSMENT — CONF VISUAL FIELD
COMMENTS: S/P RIGHT EYE ENUCLEATION
OD_SUPERIOR_NASAL_RESTRICTION: 1
OD_INFERIOR_TEMPORAL_RESTRICTION: 1
OD_INFERIOR_NASAL_RESTRICTION: 1
OD_SUPERIOR_TEMPORAL_RESTRICTION: 1

## 2024-10-02 NOTE — NURSING NOTE
Chief Complaints and History of Present Illnesses   Patient presents with    Post Op (Ophthalmology) Right Eye       Chief Complaint(s) and History of Present Illness(es)       Post Op (Ophthalmology) Right Eye              Laterality: right eye              Comments    S/p Right eye enucleation with placement of a 22 mm silicone implant with the muscles attached. Temporary tarsorrhaphy 8/2/24. Occasionally will get headaches around the right eye. Using maxitrol ointment once daily. Has  appt mid November. Has not seen oncology-has the name of someone but have not scheduled                   CHILO Lee

## 2024-10-02 NOTE — PROGRESS NOTES
Chief Complaint(s) and History of Present Illness(es)     Post Op (Ophthalmology) Right Eye            Laterality: right eye          Comments    S/p Right eye enucleation with placement of a 22 mm silicone implant with   the muscles attached. Temporary tarsorrhaphy 8/2/24. Occasionally will get   headaches around the right eye. Using maxitrol ointment once daily. Has    appt mid November. Has not seen oncology-has the name of someone   but have not scheduled     I emphasized she needs to establish care with an oncologist. They prefer to do it locally.  [place] testing pending.        Shira Abernathy is about 2 months status post right enucleation for choroidal melanoma.   She is doing well. She needs a complete eye exam, may have cataract impacting her vision left eye.     She will follow up with me in one year.     Complete documentation of historical and exam elements from today's encounter can be found in the full encounter summary report (not reduplicated in this progress note). I personally obtained the chief complaint(s) and history of present illness.  I confirmed and edited as necessary the review of systems, past medical/surgical history, family history, social history, and examination findings as documented by others; and I examined the patient myself. I personally reviewed the relevant tests, images, and reports as documented above. I formulated and edited as necessary the assessment and plan and discussed the findings and management plan with the patient and family. - Shaggy Aguilar MD

## 2024-10-17 LAB — SPECIMEN STATUS: NORMAL

## 2024-10-25 ENCOUNTER — TELEPHONE (OUTPATIENT)
Dept: OPHTHALMOLOGY | Facility: CLINIC | Age: 74
End: 2024-10-25

## 2024-10-25 DIAGNOSIS — C69.31 CHOROID MELANOMA OF RIGHT EYE (H): Primary | ICD-10-CM

## 2024-10-25 NOTE — TELEPHONE ENCOUNTER
Shira LESVIA Mccabetanika's  Romaine called stating that they need a referral to the oncologist in [place], Dr. Francisco Del Real at [facility].  I told him that we would send a referral and all the notes.

## 2024-10-28 ENCOUNTER — PATIENT OUTREACH (OUTPATIENT)
Dept: ONCOLOGY | Facility: CLINIC | Age: 74
End: 2024-10-28

## 2024-10-28 DIAGNOSIS — C69.90 OCULAR MELANOMA (H): Primary | ICD-10-CM

## 2024-10-28 DIAGNOSIS — C69.30 CHOROIDAL MALIGNANT MELANOMA (H): ICD-10-CM

## 2024-10-28 NOTE — PROGRESS NOTES
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 Aguilar on 10/02/24. Patient is s/p Right eye enucleation with placement of a 22 mm silicone implant with the muscles attached. Temporary tarsorrhaphy 8/2/24. Occasionally will get headaches around the right eye. Using maxitrol ointment once daily. Has    appt mid November.     Case Report   Surgical Pathology Report                         Case: [id number]                                   Authorizing Provider:  Shaggy Aguilar MD      Collected:           08/02/2024 11:51 AM           Ordering Location:     [facility]          Received:            08/02/2024 12:21 PM                                  [facility]                                                             Pathologist:           Linsey Xavier MD                                                         Specimen:    Eye, Right, right eye                                                                      Final Diagnosis   A. Eye, right, enucleation:  1. 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  2. Exudative retinal detachment.  3. Subtle iris neovascularization with microhyphema.  4. Cortical cataract.   Electronically signed by Linsey Xavier MD on 8/8/2024 at  9:47 AM   Synoptic Checklist   UVEAL MELANOMA   8th Edition - Protocol posted: 3/23/2022UVEAL MELANOMA - All Specimens  CLINICAL   Treatment History  No known preoperative therapy   SPECIMEN   Procedure  Enucleation   Tumor Sampling for Molecular Studies  No   Specimen Laterality  Right   TUMOR   Tumor Site (macroscopic examination / transillumination)  Temporal quadrant of globe   Tumor Site after Sectioning  Temporal quadrant of globe   Distance from Anterior Edge of Tumor to Limbus at Cut Edge  11.5 mm   Distance from Posterior Margin of Tumor Base to Edge of Optic Disc  6 mm   Tumor Size after Sectioning     Greatest Basal Diameter of Tumor  13 mm   Basal Diameter at Cut Edge of Tumor  11 mm   Greatest Thickness of Tumor  5 mm   Thickness at Cut Edge of Tumor  5 mm   Tumor Growth Pattern  Solid mass     Dome shape   Tumor Size in Microscopic Sections     Greatest Basal Diameter of Tumor (microscopic)  11.8 mm   Greatest Thickness of Tumor (microscopic)  5 mm   Histologic Type  Mixed cell melanoma (greater than 10% epithelioid cells and less than 90% spindle cells)   Other Ocular Structures Involved by Tumor  Choroid   Tumor Location  Anterior margin between equator and ciliary body     Posterior margin between disc and equator   Scleral Involvement  Not identified   MARGINS   Margin Status  All margins negative for melanoma   REGIONAL LYMPH NODES   Regional Lymph Node Status  Not applicable (no regional lymph nodes submitted or found)   PATHOLOGIC STAGE CLASSIFICATION (pTNM, AJCC 8th Edition)   Reporting of pT, pN, and (when applicable) pM categories is based on information available to the pathologist at the time the report is issued. As per the AJCC (Chapter 1, 8th Ed.) it is the managing physician s  "responsibility to establish the final pathologic stage based upon all pertinent information, including but potentially not limited to this pathology report.   pT Category  pT2a   pN Category  pN not assigned (no nodes submitted or found)   ADDITIONAL FINDINGS   Additional Findings  Mitotic rate: 3 mitoses per 40 high-power fields (HPF)     Degree of pigmentation: Moderate     Retinal detachment   .          Per Dr. Aguilar  on 7/23/24: \"\"She has had imaging earlier including Chest/Abdomen CT. I don't have those reports, but they report it was \"clean.\" \"      Records Location: See Bookmarked material     Records Needed: NA     Additional testing needed prior to consult: ?    Payor: MEDICARE / Plan: MEDICARE PT A ONLY / Product Type: Medicare /     October 28, 2024    After chart review. Looks like patient would like to be referred to an oncologist in [place]. Dr. Francisco Del Real at [facility]. Triage note updated.     October 30, 2024  Received a  message from NPS this morning:     \"When calling to provider the phone number to patient/family for [facility]. Writer spoke with  Romaine who stated that he had spoke with a Nurse for Dr. Del Real who stated he did not feel like that he would be a good fit to see the patient. Please review and follow up with family and update triage instructions as needed.\"    Called patient this morning to introduce self and the role of the nurse navigator as well as discuss the referral received. Per patient and her , they are trying to figure things out and are unsure of how they would like to proceed. They do not have insurance. They worked with Dr. Aguilar's team and applied for [facility]. They are planning on calling Dr. Aguilar's clinic today to discuss and would really like his recommendations as to how to proceed. Provided them with contact information for nurse navigator. They will call Nurse Navigator back once they have talked to . " Matilda.     October 31, 2024  Message received from patient's  about request for services regarding financial services and insurance assistance. Referral for  placed. Provided patient with financial counselors number as well. Per patient's  will hold off on scheduling until financial issues are resolved. Patient's  will call back when ready to schedule.     November 4, 2024  Received a call back from patient's . He has spoken with the financial counselors and would like to proceed with scheduling his wife at [facility] for labs, CT scan and consultation with MD. Reviewed options with him and forwarded him to NPS to schedule.       Jillian INFANTEN, RN, OCN  Oncology Nurse Navigator   [facility]  Cancer Care Service Line   New Patient Hem/Onc Scheduling / Referrals: [phone] (fax: [phone] )

## 2024-11-01 ENCOUNTER — PATIENT OUTREACH (OUTPATIENT)
Dept: CARE COORDINATION | Facility: CLINIC | Age: 74
End: 2024-11-01

## 2024-11-01 NOTE — PROGRESS NOTES
Social Work - Telephone/MyChart message  [facility]  Data: New patient dx melanoma of the right eye.   Patient Name: Shira Abernathy  Goes By: Shira    DIDI/Age: 1950 (73 year old)      Referral Source: Jillian Nguyen    Reason for Referral: insurance/financial questions    Per chart review patient has 100% latanya care at [facility].  Also could [place] for housing for appt in \[facility]\"".   Can call aging and disability services in [facility] to see if has service for assisting with Medicare navigation.  [phone].    Intervention: Unable to leave message due to voice mail box not being set up .   Plan:  will await patient's return phone call/message and provide assistance at that time.    JUSTIN Garner, [facility]   Adult Oncology - [place]  [phone]  Onsite [place] on    *Please note does not work on .   Support Groups at [facility]: Social Work Services for Cancer Patients ([email or web address])

## 2024-11-04 NOTE — TELEPHONE ENCOUNTER
RECORDS STATUS - ALL OTHER DIAGNOSIS      RECORDS RECEIVED FROM: [facility]   NOTES STATUS DETAILS   OFFICE NOTE from referring provider Epic 10/2/2024 - Dr. Shaggy Aguilar    DISCHARGE SUMMARY from hospital [facility] 8/2/2024 - [facility]    OPERATIVE REPORT Epic 8/2/2024 - Right eye enucleation with implant   MEDICATION LIST [facility]    LABS     PATHOLOGY REPORTS [facility] 8/2/2024 - [id number]    ANYTHING RELATED TO DIAGNOSIS Epic 8/2/2024   GENONOMIC TESTING     TYPE: External - [employer]  8/2/2024

## 2024-11-11 NOTE — PROGRESS NOTES
[facility]   New Patient Visit    Name: Shira Abernathy  MRN: [id number]  Date of visit: Nov 12, 2024    Diagnosis: Uveal Melanoma  Stage: T2aN0M?    Performance status: ECOG 1    Oncology History:   -R eye choroidal melanoma, diagnosed April 2024    HPI:   Shira Abernathy is a 73 year old female with a history of uveal melanoma of the right eye presents after enucleation for evaluation.  The patient was seen by [facility] [place] in April 2024 and was noted to have a choroidal lesion which measured 4.2 mm x 12.1 mm x 11.2 mm which was highly suspicious for uveal melanoma.  In May 2024 she underwent a CT chest abdomen pelvis at [facility] in [place] which was negative for metastasis based on notes. We do not have a report or the images. She was seen again in July 2024 in which the mass had grown to 4.98mm (h)x 14.1mm (l) x 13.7mm (w).  A variety of local treatment options were discussed with the patient and she decided to undergo enucleation with Dr. Aguilar on August 2, 2024.  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 DaWanda was sent which showed patient was GPEP class 2 and PRAME negative. [place] eye prosthesis. She is feeling well. Recently has had some issues with SIBO and is seeing a naturopath for care. She does not have insurance and is 100% latanya care at [facility].     The pt has a long history of Celiac disease that has been controlled. No other autoimmune diseases. She can walk over a mile without issues. She spends her days reading nearly all day and also handles cooking, cleaning and care for their daughter Roxie. She denies abdominal pain, SOB. She had COVID pretty badly in 2021 and has a chronic cough from that.    I personally reviewed CT  Chest, Abdomen with radiology which showed a pleural based RLL nodule and few mm RUL nodule, L sided ground glass nodule near spine.  No evidence of metastasis in the liver. No scans for comparison.         Past medical history  Past Medical History:   Diagnosis Date    Celiac disease     Hypothyroidism     Melanoma (H)     RIGHT    SVT (supraventricular tachycardia) (H)    Ablation for SVT  Post partum hemorrhage  Cholera  Lipomas  No other skin cancers    Family history of cancer:  Paternal Grandfather- lymphoma  9 siblings without cancer      Social history:  Lives in [place]  5 kids-homeschooled  Family did traveling singing  No smoking history    Exam:   There were no vitals taken for this visit.    Gen: welll appearing  HEENT: R eye s/p enucleation  Neck/Lymph: No LAD  Resp: CTAB  Skin: Few lipomas on bilateral arms      Labs:   Reviewed in chart. Key findings include normal LFTs, Cr and CBC    Imaging:   All pertinent imaging studies personally reviewed, schwartz findings included in oncology history above      Pathology:   Reviewed in chart, key findings included in history above     A. Eye, right, enucleation:  1. 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  2. Exudative retinal detachment.  3. Subtle iris neovascularization with microhyphema.  4. Cortical cataract.    Assessment and Plan:   Shira Abernathy is a 53 year old female with history of SVT s/p ablation, Celiac disease, hypothyroidism who presents for evaluation of Uveal Melanoma.      # Uveal Melanoma  Today, I discussed  with the patient that her pathology findings confirm uveal melanoma. We received the results of her [employer] testing which showed she is GEP class 2 and PRAME negative. We discussed that prognostically Class 2 GEP is high risk for distant metastasis with only 28% of patients being metastasis free at 5 years. We did discuss that PRAME negative does suggest an improved survival compared to those who are GEP class 2 and PRAME positive and that her prognosis is likely closer to 58% metastasis free survival at 5 years (Alexandrea, JCO 2024). She underwent staging CT scans today in which the liver is without metastasis. There are a few nodules in the RLL and LLL. The pleural based nodule in the RLL may be scarring. There are no scans for comparison. I discussed having the pt send over scans from May for comparison. We discussed that if the lesions are new or significant growth from May, we would obtain a biopsy. If stable, we will continue with close imaging surveillance for distant metastasis every 3 months. We discussed the general treatment paradigms for metastatic uveal melanoma including Tebentafusp if she is HLA-A*02:01 positive vs. Immunotherapy if HLA-A*02:01 negative. She is currently receiving latanya care at [facility] and plans to [id number] all care here going forward.    Plan:  -pt to send over scans from May for comparison- will do outside read of scans  -plan for every 3 month CT Chest w/ contrast and MRI liver, LFTs given high risk for ditant mets  -return visit in 3 months with scans and labs prior to visit       Patti Cruz MD   of Medicine  Division of Hematology, Oncology and Transplantation    ---  75 minutes were spent on the date of the encounter performing chart review, history and exam, documentation, and further activities as noted above.     The longitudinal plan of care for the diagnosis(es)/condition(s) as documented were addressed during this visit. Due to the added  complexity in care, I will continue to support Shira in the subsequent management and with ongoing continuity of care.

## 2024-11-12 ENCOUNTER — ANCILLARY PROCEDURE (OUTPATIENT)
Dept: CT IMAGING | Facility: CLINIC | Age: 74
End: 2024-11-12
Attending: HOSPITALIST

## 2024-11-12 ENCOUNTER — PRE VISIT (OUTPATIENT)
Dept: ONCOLOGY | Facility: CLINIC | Age: 74
End: 2024-11-12

## 2024-11-12 ENCOUNTER — PATIENT OUTREACH (OUTPATIENT)
Dept: CARE COORDINATION | Facility: CLINIC | Age: 74
End: 2024-11-12

## 2024-11-12 ENCOUNTER — PATIENT OUTREACH (OUTPATIENT)
Dept: ONCOLOGY | Facility: CLINIC | Age: 74
End: 2024-11-12

## 2024-11-12 ENCOUNTER — APPOINTMENT (OUTPATIENT)
Dept: LAB | Facility: CLINIC | Age: 74
End: 2024-11-12
Attending: HOSPITALIST

## 2024-11-12 ENCOUNTER — ONCOLOGY VISIT (OUTPATIENT)
Dept: ONCOLOGY | Facility: CLINIC | Age: 74
End: 2024-11-12
Attending: HOSPITALIST

## 2024-11-12 VITALS
HEART RATE: 61 BPM | SYSTOLIC BLOOD PRESSURE: 136 MMHG | RESPIRATION RATE: 18 BRPM | WEIGHT: 137.4 LBS | TEMPERATURE: 97.5 F | OXYGEN SATURATION: 100 % | BODY MASS INDEX: 22.18 KG/M2 | DIASTOLIC BLOOD PRESSURE: 71 MMHG

## 2024-11-12 DIAGNOSIS — C69.31 CHOROID MELANOMA OF RIGHT EYE (H): ICD-10-CM

## 2024-11-12 DIAGNOSIS — C69.30 CHOROIDAL MALIGNANT MELANOMA (H): ICD-10-CM

## 2024-11-12 LAB
ALBUMIN SERPL BCG-MCNC: 4.3 G/DL (ref 3.5–5.2)
ALP SERPL-CCNC: 59 U/L (ref 40–150)
ALT SERPL W P-5'-P-CCNC: 14 U/L (ref 0–50)
ANION GAP SERPL CALCULATED.3IONS-SCNC: 9 MMOL/L (ref 7–15)
AST SERPL W P-5'-P-CCNC: 23 U/L (ref 0–45)
BASOPHILS # BLD AUTO: 0.1 10E3/UL (ref 0–0.2)
BASOPHILS NFR BLD AUTO: 1 %
BILIRUB SERPL-MCNC: 0.4 MG/DL
BUN SERPL-MCNC: 15.8 MG/DL (ref 8–23)
CALCIUM SERPL-MCNC: 9 MG/DL (ref 8.8–10.4)
CHLORIDE SERPL-SCNC: 105 MMOL/L (ref 98–107)
CREAT BLD-MCNC: 0.8 MG/DL (ref 0.5–1)
CREAT SERPL-MCNC: 0.77 MG/DL (ref 0.51–0.95)
EGFRCR SERPLBLD CKD-EPI 2021: 80 ML/MIN/1.73M2
EGFRCR SERPLBLD CKD-EPI 2021: >60 ML/MIN/1.73M2
EOSINOPHIL # BLD AUTO: 0.2 10E3/UL (ref 0–0.7)
EOSINOPHIL NFR BLD AUTO: 5 %
ERYTHROCYTE [DISTWIDTH] IN BLOOD BY AUTOMATED COUNT: 12.6 % (ref 10–15)
GLUCOSE SERPL-MCNC: 97 MG/DL (ref 70–99)
HCO3 SERPL-SCNC: 26 MMOL/L (ref 22–29)
HCT VFR BLD AUTO: 35.8 % (ref 35–47)
HGB BLD-MCNC: 12.2 G/DL (ref 11.7–15.7)
IMM GRANULOCYTES # BLD: 0 10E3/UL
IMM GRANULOCYTES NFR BLD: 0 %
LYMPHOCYTES # BLD AUTO: 1.7 10E3/UL (ref 0.8–5.3)
LYMPHOCYTES NFR BLD AUTO: 43 %
MCH RBC QN AUTO: 30.3 PG (ref 26.5–33)
MCHC RBC AUTO-ENTMCNC: 34.1 G/DL (ref 31.5–36.5)
MCV RBC AUTO: 89 FL (ref 78–100)
MONOCYTES # BLD AUTO: 0.4 10E3/UL (ref 0–1.3)
MONOCYTES NFR BLD AUTO: 10 %
NEUTROPHILS # BLD AUTO: 1.7 10E3/UL (ref 1.6–8.3)
NEUTROPHILS NFR BLD AUTO: 41 %
NRBC # BLD AUTO: 0 10E3/UL
NRBC BLD AUTO-RTO: 0 /100
PLATELET # BLD AUTO: 199 10E3/UL (ref 150–450)
POTASSIUM SERPL-SCNC: 3.7 MMOL/L (ref 3.4–5.3)
PROT SERPL-MCNC: 6.6 G/DL (ref 6.4–8.3)
RBC # BLD AUTO: 4.02 10E6/UL (ref 3.8–5.2)
SODIUM SERPL-SCNC: 140 MMOL/L (ref 135–145)
WBC # BLD AUTO: 4 10E3/UL (ref 4–11)

## 2024-11-12 PROCEDURE — 99205 OFFICE O/P NEW HI 60 MIN: CPT | Performed by: HOSPITALIST

## 2024-11-12 PROCEDURE — 85004 AUTOMATED DIFF WBC COUNT: CPT | Performed by: HOSPITALIST

## 2024-11-12 PROCEDURE — 80053 COMPREHEN METABOLIC PANEL: CPT | Performed by: HOSPITALIST

## 2024-11-12 PROCEDURE — 74177 CT ABD & PELVIS W/CONTRAST: CPT | Performed by: RADIOLOGY

## 2024-11-12 PROCEDURE — 99213 OFFICE O/P EST LOW 20 MIN: CPT | Performed by: HOSPITALIST

## 2024-11-12 PROCEDURE — 71260 CT THORAX DX C+: CPT | Performed by: RADIOLOGY

## 2024-11-12 PROCEDURE — 99417 PROLNG OP E/M EACH 15 MIN: CPT | Performed by: HOSPITALIST

## 2024-11-12 PROCEDURE — G2211 COMPLEX E/M VISIT ADD ON: HCPCS | Performed by: HOSPITALIST

## 2024-11-12 PROCEDURE — 36415 COLL VENOUS BLD VENIPUNCTURE: CPT | Performed by: HOSPITALIST

## 2024-11-12 PROCEDURE — 80053 COMPREHEN METABOLIC PANEL: CPT | Performed by: PATHOLOGY

## 2024-11-12 RX ORDER — IOPAMIDOL 755 MG/ML
77 INJECTION, SOLUTION INTRAVASCULAR ONCE
Status: COMPLETED | OUTPATIENT
Start: 2024-11-12 | End: 2024-11-12

## 2024-11-12 RX ADMIN — IOPAMIDOL 77 ML: 755 INJECTION, SOLUTION INTRAVASCULAR at 10:34

## 2024-11-12 ASSESSMENT — PAIN SCALES - GENERAL: PAINLEVEL_OUTOF10: NO PAIN (0)

## 2024-11-12 NOTE — PROGRESS NOTES
Met Shira in clinic. Provided contact information for myself, clinic, and clinic resources. Discussed workflow for orders placed and when to contact triage vs RNCC. Obtained signed consent to communicate. Received permission to obtain records from [facility]. Answered all Pt questions.

## 2024-11-12 NOTE — NURSING NOTE
"Oncology Rooming Note    November 12, 2024 12:50 PM   Shira Abernathy is a 73 year old female who presents for:    Chief Complaint   Patient presents with    Blood Draw     Labs drawn via  by RN in lab, vitals taken.     Oncology Clinic Visit     Choroidal malignant melanoma; Choroid melanoma of right eye      Initial Vitals: BP (!) 145/67 (BP Location: Right arm, Patient Position: Sitting, Cuff Size: Adult Regular)   Pulse 61   Temp 97.5  F (36.4  C) (Oral)   Resp 18   Wt 62.3 kg (137 lb 6.4 oz)   SpO2 100%   BMI 22.18 kg/m   Estimated body mass index is 22.18 kg/m  as calculated from the following:    Height as of 8/2/24: 1.676 m (5' 6\").    Weight as of this encounter: 62.3 kg (137 lb 6.4 oz). Body surface area is 1.7 meters squared.  No Pain (0) Comment: Data Unavailable   No LMP recorded. Patient is postmenopausal.  Allergies reviewed: Yes  Medications reviewed: Yes    Medications: Medication refills not needed today.  Pharmacy name entered into [employer]: [facility] [address] [place] [address]    Frailty Screening:   Is the patient here for a new oncology consult visit in cancer care? 1. Yes. Over the past month, have you experienced difficulty or required a caregiver to assist with:   1. Balance, walking or general mobility (including any falls)? YES  2. Completion of self-care tasks such as bathing, dressing, toileting, grooming/hygiene?  NO  3. Concentration or memory that affects your daily life?  YES       Clinical concerns: Pt would like to know what is being looked at with the blood tests were conducted.      Radha Loyd              "

## 2024-11-12 NOTE — NURSING NOTE
Chief Complaint   Patient presents with    Blood Draw     Labs drawn via  by RN in lab, vitals taken.      Labs collected from venipuncture by RN. Vitals taken. Checked in for appointment(s).    Trish Herring RN

## 2024-11-12 NOTE — DISCHARGE INSTRUCTIONS

## 2024-11-12 NOTE — LETTER
11/12/2024      Shira Abernathy  [address] [facility] [address]      Dear Colleague,    Thank you for referring your patient, Shira Abernathy, to the [facility]. Please see a copy of my visit note below.    [facility]   New Patient Visit    Name: Shira Abernathy  MRN: [id number]  Date of visit: Nov 12, 2024    Diagnosis: Uveal Melanoma  Stage: T2aN0M?    Performance status: ECOG 1    Oncology History:   -R eye choroidal melanoma, diagnosed April 2024    HPI:   Shira Abernathy is a 73 year old female with a history of uveal melanoma of the right eye presents after enucleation for evaluation.  The patient was seen by [facility] [place] in April 2024 and was noted to have a choroidal lesion which measured 4.2 mm x 12.1 mm x 11.2 mm which was highly suspicious for uveal melanoma.  In May 2024 she underwent a CT chest abdomen pelvis at [facility] in [place] which was negative for metastasis based on notes. We do not have a report or the images. She was seen again in July 2024 in which the mass had grown to 4.98mm (h)x 14.1mm (l) x 13.7mm (w).  A variety of local treatment options were discussed with the patient and she decided to undergo enucleation with Dr. Aguilar on August 2, 2024.  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 QVPN was sent which showed patient was GPEP class 2 and PRAME negative. [place]. She is feeling well. Recently has had some issues with SIBO and is seeing a naturopath for care. She does not have insurance and is 100% latanya care at [facility].     The pt has a long history of Celiac disease that has been controlled. No other autoimmune diseases. She can walk over a mile without  issues. She spends her days reading nearly all day and also handles cooking, cleaning and care for their daughter Roxie. She denies abdominal pain, SOB. She had COVID pretty badly in 2021 and has a chronic cough from that.    I personally reviewed CT Chest, Abdomen with radiology which showed a pleural based RLL nodule and few mm RUL nodule, L sided ground glass nodule near spine.  No evidence of metastasis in the liver. No scans for comparison.         Past medical history  Past Medical History:   Diagnosis Date     Celiac disease      Hypothyroidism      Melanoma (H)     RIGHT     SVT (supraventricular tachycardia) (H)    Ablation for SVT  Post partum hemorrhage  Cholera  Lipomas  No other skin cancers    Family history of cancer:  Paternal Grandfather- lymphoma  9 siblings without cancer      Social history:  Lives in [place]  5 kids-homeschooled  Family did traveling singing  No smoking history    Exam:   There were no vitals taken for this visit.    Gen: welll appearing  HEENT: R eye s/p enucleation  Neck/Lymph: No LAD  Resp: CTAB  Skin: Few lipomas on bilateral arms      Labs:   Reviewed in chart. Key findings include normal LFTs, Cr and CBC    Imaging:   All pertinent imaging studies personally reviewed, schwartz findings included in oncology history above      Pathology:   Reviewed in chart, key findings included in history above     A. Eye, right, enucleation:  1. 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  2. Exudative retinal detachment.  3. Subtle iris neovascularization with  microhyphema.  4. Cortical cataract.    Assessment and Plan:   Shira Abernathy is a 53 year old female with history of SVT s/p ablation, Celiac disease, hypothyroidism who presents for evaluation of Uveal Melanoma.      # Uveal Melanoma  Today, I discussed with the patient that her pathology findings confirm uveal melanoma. We received the results of her [employer] testing which showed she is GEP class 2 and PRAME negative. We discussed that prognostically Class 2 GEP is high risk for distant metastasis with only 28% of patients being metastasis free at 5 years. We did discuss that PRAME negative does suggest an improved survival compared to those who are GEP class 2 and PRAME positive and that her prognosis is likely closer to 58% metastasis free survival at 5 years ([place], JCO 2024). She underwent staging CT scans today in which the liver is without metastasis. There are a few nodules in the RLL and LLL. The pleural based nodule in the RLL may be scarring. There are no scans for comparison. I discussed having the pt send over scans from May for comparison. We discussed that if the lesions are new or significant growth from May, we would obtain a biopsy. If stable, we will continue with close imaging surveillance for distant metastasis every 3 months. We discussed the general treatment paradigms for metastatic uveal melanoma including Tebentafusp if she is HLA-A*02:01 positive vs. Immunotherapy if HLA-A*02:01 negative. She is currently receiving latanya care at [facility] and plans to [id number] all care here going forward.    Plan:  -pt to send over scans from May for comparison- will do outside read of scans  -plan for every 3 month CT Chest w/ contrast and MRI liver, LFTs given high risk for ditant mets  -return visit in 3 months with scans and labs prior to visit       Patti Cruz MD   of Medicine  Division of Hematology, Oncology and Transplantation    ---  75 minutes were spent on  the date of the encounter performing chart review, history and exam, documentation, and further activities as noted above.     The longitudinal plan of care for the diagnosis(es)/condition(s) as documented were addressed during this visit. Due to the added complexity in care, I will continue to support Shira in the subsequent management and with ongoing continuity of care.            Again, thank you for allowing me to participate in the care of your patient.        Sincerely,        Patti Cruz MD

## 2024-11-12 NOTE — PROGRESS NOTES
Social Work - Telephone/AIShart message  [facility]  2nd attempt  Patient Name: Shira Abernathy  Goes By: Shira    DIDI/Age: 1950 (73 year old)      In with provider at the moment. Will call SW back.     Intervention:  Spouse Romaine will call SW back .   Plan:  will await patient's return phone call/message and provide assistance at that time.    JUSTIN Garner, [facility]   Adult Oncology - [place]  [phone]  Onsite [place] on    *Please note does not work on .   Support Groups at [facility]: Social Work Services for Cancer Patients ([email or web address])

## 2024-11-14 ENCOUNTER — HOSPITAL ENCOUNTER (OUTPATIENT)
Dept: GENERAL RADIOLOGY | Facility: CLINIC | Age: 74
Discharge: HOME | End: 2024-11-14
Attending: HOSPITALIST

## 2024-11-14 ENCOUNTER — TRANSFERRED RECORDS (OUTPATIENT)
Dept: HEALTH INFORMATION MANAGEMENT | Facility: CLINIC | Age: 74
End: 2024-11-14

## 2024-11-14 PROCEDURE — 999N000122 CT OUTSIDE READ

## 2024-11-19 ENCOUNTER — TELEPHONE (OUTPATIENT)
Dept: ONCOLOGY | Facility: CLINIC | Age: 74
End: 2024-11-19

## 2024-11-19 NOTE — TELEPHONE ENCOUNTER
I discussed Shira's scan results with her  Reese. I was able to personally review and compare the CT Chest/Abdomen from May and October. She has some scarring, nodules and nodular atelectasis which appear to be post infectious. She has a history of long lasting COVID. With no definitive evidence of metastatic disease, we will continue every 3 month scans. Next in Feb 2025.

## 2025-02-04 ENCOUNTER — PATIENT OUTREACH (OUTPATIENT)
Dept: ONCOLOGY | Facility: CLINIC | Age: 75
End: 2025-02-04

## 2025-02-04 NOTE — PROGRESS NOTES
[facility]: Cancer Care                                                                                          Enrollment status has been changed to Maintenance    Signature:  Margaret Chaparro RN

## 2025-02-12 ENCOUNTER — APPOINTMENT (OUTPATIENT)
Dept: LAB | Facility: CLINIC | Age: 75
End: 2025-02-12

## 2025-02-12 ENCOUNTER — ONCOLOGY VISIT (OUTPATIENT)
Dept: ONCOLOGY | Facility: CLINIC | Age: 75
End: 2025-02-12
Attending: HOSPITALIST

## 2025-02-12 ENCOUNTER — ANCILLARY PROCEDURE (OUTPATIENT)
Dept: CT IMAGING | Facility: CLINIC | Age: 75
End: 2025-02-12
Attending: HOSPITALIST

## 2025-02-12 VITALS
RESPIRATION RATE: 18 BRPM | DIASTOLIC BLOOD PRESSURE: 75 MMHG | HEART RATE: 72 BPM | BODY MASS INDEX: 22.81 KG/M2 | OXYGEN SATURATION: 98 % | TEMPERATURE: 97.4 F | SYSTOLIC BLOOD PRESSURE: 124 MMHG | WEIGHT: 141.3 LBS

## 2025-02-12 DIAGNOSIS — C69.31 CHOROID MELANOMA OF RIGHT EYE (H): ICD-10-CM

## 2025-02-12 LAB
ALBUMIN SERPL BCG-MCNC: 4.5 G/DL (ref 3.5–5.2)
ALP SERPL-CCNC: 58 U/L (ref 40–150)
ALT SERPL W P-5'-P-CCNC: 25 U/L (ref 0–50)
ANION GAP SERPL CALCULATED.3IONS-SCNC: 10 MMOL/L (ref 7–15)
AST SERPL W P-5'-P-CCNC: 34 U/L (ref 0–45)
BILIRUB SERPL-MCNC: 0.3 MG/DL
BUN SERPL-MCNC: 17.2 MG/DL (ref 8–23)
CALCIUM SERPL-MCNC: 9.5 MG/DL (ref 8.8–10.4)
CHLORIDE SERPL-SCNC: 103 MMOL/L (ref 98–107)
CREAT BLD-MCNC: 0.9 MG/DL (ref 0.5–1)
CREAT SERPL-MCNC: 0.92 MG/DL (ref 0.51–0.95)
EGFRCR SERPLBLD CKD-EPI 2021: 65 ML/MIN/1.73M2
EGFRCR SERPLBLD CKD-EPI 2021: >60 ML/MIN/1.73M2
GLUCOSE SERPL-MCNC: 71 MG/DL (ref 70–99)
HCO3 SERPL-SCNC: 27 MMOL/L (ref 22–29)
POTASSIUM SERPL-SCNC: 4.1 MMOL/L (ref 3.4–5.3)
PROT SERPL-MCNC: 7.2 G/DL (ref 6.4–8.3)
SODIUM SERPL-SCNC: 140 MMOL/L (ref 135–145)

## 2025-02-12 PROCEDURE — 99213 OFFICE O/P EST LOW 20 MIN: CPT | Performed by: HOSPITALIST

## 2025-02-12 PROCEDURE — 80053 COMPREHEN METABOLIC PANEL: CPT | Performed by: HOSPITALIST

## 2025-02-12 PROCEDURE — 71260 CT THORAX DX C+: CPT | Mod: GC | Performed by: RADIOLOGY

## 2025-02-12 PROCEDURE — 80053 COMPREHEN METABOLIC PANEL: CPT | Performed by: PATHOLOGY

## 2025-02-12 PROCEDURE — 36415 COLL VENOUS BLD VENIPUNCTURE: CPT | Performed by: HOSPITALIST

## 2025-02-12 RX ORDER — IOPAMIDOL 755 MG/ML
74 INJECTION, SOLUTION INTRAVASCULAR ONCE
Status: COMPLETED | OUTPATIENT
Start: 2025-02-12 | End: 2025-02-12

## 2025-02-12 RX ADMIN — IOPAMIDOL 74 ML: 755 INJECTION, SOLUTION INTRAVASCULAR at 13:21

## 2025-02-12 ASSESSMENT — PAIN SCALES - GENERAL: PAINLEVEL_OUTOF10: NO PAIN (0)

## 2025-02-12 NOTE — PROGRESS NOTES
[facility]  Return Patient Visit    Name: Shira Abernathy  MRN: [id number]  Date of visit: Feb 12, 2025    Diagnosis: Uveal Melanoma  Stage: R1nP5B1  Performance status: 1    Brief Summary:   Shira Abernathy is a 73 year old female with a history of uveal melanoma of the right eye presents after enucleation for evaluation.  The patient was seen by [facility] [place] in April 2024 and was noted to have a choroidal lesion which measured 4.2 mm x 12.1 mm x 11.2 mm which was highly suspicious for uveal melanoma.  In May 2024 she underwent a CT chest abdomen pelvis at [facility] in [place] which was negative for metastasis based on notes. We do not have a report or the images. She was seen again in July 2024 in which the mass had grown to 4.98mm (h)x 14.1mm (l) x 13.7mm (w).  A variety of local treatment options were discussed with the patient and she decided to undergo enucleation with Dr. Aguilar on August 2, 2024.  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 MIOX was sent which showed patient was GPEP class 2 and PRAME negative. Staging CT C/A/P was negative for metastasis. She has a stable pleural based RLL nodule and RUL nodule.    Oncology History    No history exists.         Interval history:   Shira is feeling well. She had to heal her gut after receiving antibiotics around the surgery. She has been working with a naturopath and taking an anti-inflammatory diet. Her digestive system is greatly improved.  She does have a long history of celiac disease as well.     She feels like she is still getting her bearings now after the eye surgery. She has had abnormal vision in the left eye so she feels off balance without vision on the right.    She has multiple lipomas all over the body. Some bother her  but she doesn't want to have any surgically evaluated.    Shira has been receiving latanya care. They are waiting to see if this will be renewed.  Exam:   /75 (BP Location: Left arm, Patient Position: Sitting, Cuff Size: Adult Regular)   Pulse 72   Temp 97.4  F (36.3  C) (Oral)   Resp 18   Wt 64.1 kg (141 lb 4.8 oz)   SpO2 98%   BMI 22.81 kg/m      Gen: well appearing, R eye prosthesis in place  Neck: No LAD  Resp: CTAB  CV: RRR  Abd: Soft nt nd abdomen. +BS        Labs:   Ancillary Procedure on 2025   Component Date Value Ref Range Status    Creatinine POCT 2025 0.9  0.5 - 1.0 mg/dL Final    GFR, ESTIMATED POCT 2025 >60  >60 mL/min/1.73m2 Final      Liver Function Studies -   Recent Labs   Lab Test 25  1251   PROTTOTAL 7.2   ALBUMIN 4.5   BILITOTAL 0.3   ALKPHOS 58   AST 34   ALT 25           Imagin25  CT Chest:    IMPRESSION:   1. No evidence of metastatic disease in the chest.  2. Multiple pulmonary nodules and area of round atelectasis are not  changed from prior.  MRI Liver  1. No evidence of metastatic disease.  2. 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 Recommend follow-up imaging in 6  months per guidelines detailed below.  3. Mild hepatic steatosis and hepatomegaly.  4. Bosniak II cyst in the right interpolar region. No routine  follow-up required.    Pathology:   Reviewed     Assessment and Plan:   Shira Abernathy is a 53 year old female with history of SVT s/p ablation, Celiac disease, hypothyroidism who presents for evaluation of Uveal Melanoma.     # Uveal Melanoma  -[id number], GEP Class 2, PRAME negative  -S/p enucleation  -Staging scans today negative for metastatic disease  -We  again discussed that prognostically Class 2 GEP is high risk for distant metastasis with only 28% of patients being metastasis free at 5 years. We did discuss that PRAME negative could suggest a longer time to distant  metastasis compared to those who are GEP class 2 and PRAME positive and that her prognosis may be closer to 58% metastasis free survival at 5 years (Alexandrea, JCO 2024).   -I recommend continued surveillance every 3-6 months for 5 years and then every 6-12 months for years 6-10 as per NCCN guidelines for uveal melanoma which are recently updated Jan 2025. Alternate CT C/A/P and CT Chest/MRI liver. Ok to do CT only if pt has cost issues.  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, repeat scans due in 6 months to re-evaluate    Celiac Disease:  -well controlled       Plan:  -return to clinic in 3 months with CT C/A/P and in person visit, labs  -if pt needs to move to virtual visit and local scans due to cost, she will let me know and we will work with her        Patti Cruz MD   of Medicine  Division of Hematology, Oncology and Transplantation      ---  30 minutes were spent on the date of the encounter performing chart review, history and exam, documentation, and further activities as noted above.

## 2025-02-12 NOTE — NURSING NOTE
Chief Complaint   Patient presents with    Blood Draw     Labs drawn via PIV placed by RN. VS taken.     Labs drawn from PIV placed by RN. Line flushed with saline. Vitals taken. Pt checked in for appointment(s).     Marisel Prajapati RN

## 2025-02-12 NOTE — NURSING NOTE
"Oncology Rooming Note    February 12, 2025 4:25 PM   Shira Abernathy is a 74 year old female who presents for:    Chief Complaint   Patient presents with    Blood Draw     Labs drawn via PIV placed by RN. VS taken.    Oncology Clinic Visit     Choroid melanoma of right ey     Initial Vitals: /75 (BP Location: Left arm, Patient Position: Sitting, Cuff Size: Adult Regular)   Pulse 72   Temp 97.4  F (36.3  C) (Oral)   Resp 18   Wt 64.1 kg (141 lb 4.8 oz)   SpO2 98%   BMI 22.81 kg/m   Estimated body mass index is 22.81 kg/m  as calculated from the following:    Height as of 8/2/24: 1.676 m (5' 6\").    Weight as of this encounter: 64.1 kg (141 lb 4.8 oz). Body surface area is 1.73 meters squared.  No Pain (0) Comment: Data Unavailable   No LMP recorded. Patient is postmenopausal.  Allergies reviewed: Yes  Medications reviewed: Yes    Medications: Medication refills not needed today.  Pharmacy name entered into [employer]: [facility] [address] [place] [address]    Frailty Screening:   Is the patient here for a new oncology consult visit in cancer care? 2. No    PHQ9:  Did this patient require a PHQ9?: No      Clinical concerns: pt started natural products rather than synthroid.       Álvaro Ward"

## 2025-02-12 NOTE — DISCHARGE INSTRUCTIONS

## 2025-02-12 NOTE — LETTER
2/12/2025      Shira Abernathy  [address] [address]      Dear Colleague,    Thank you for referring your patient, Shira Abernathy, to the [facility]. Please see a copy of my visit note below.    [facility]  Return Patient Visit    Name: Shira Abernathy  MRN: [id number]  Date of visit: Feb 12, 2025    Diagnosis: Uveal Melanoma  Stage: [id number]  Performance status: 1    Brief Summary:   Shira Abernathy is a 73 year old female with a history of uveal melanoma of the right eye presents after enucleation for evaluation.  The patient was seen by [facility] [place] in April 2024 and was noted to have a choroidal lesion which measured 4.2 mm x 12.1 mm x 11.2 mm which was highly suspicious for uveal melanoma.  In May 2024 she underwent a CT chest abdomen pelvis at [facility] in [place] which was negative for metastasis based on notes. We do not have a report or the images. She was seen again in July 2024 in which the mass had grown to 4.98mm (h)x 14.1mm (l) x 13.7mm (w).  A variety of local treatment options were discussed with the patient and she decided to undergo enucleation with Dr. Aguilar on August 2, 2024.  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 [place] [facility] was sent which showed patient was GPEP class 2 and PRAME negative. Staging CT C/A/P was negative for metastasis. She has a stable pleural based RLL nodule and RUL nodule.    Oncology History    No history exists.         Interval history:   Shira is feeling well. She had to heal her gut after receiving antibiotics around the surgery. She has been working with a naturopath and taking an anti-inflammatory diet. Her digestive system is greatly improved.  She does have a long history of celiac disease as well.      She feels like she is still getting her bearings now after the eye surgery. She has had abnormal vision in the left eye so she feels off balance without vision on the right.    She has multiple lipomas all over the body. Some bother her but she doesn't want to have any surgically evaluated.    Shira has been receiving latanya care. They are waiting to see if this will be renewed.  Exam:   /75 (BP Location: Left arm, Patient Position: Sitting, Cuff Size: Adult Regular)   Pulse 72   Temp 97.4  F (36.3  C) (Oral)   Resp 18   Wt 64.1 kg (141 lb 4.8 oz)   SpO2 98%   BMI 22.81 kg/m      Gen: well appearing, R eye prosthesis in place  Neck: No LAD  Resp: CTAB  CV: RRR  Abd: Soft nt nd abdomen. +BS        Labs:   Ancillary Procedure on 2025   Component Date Value Ref Range Status     Creatinine POCT 2025 0.9  0.5 - 1.0 mg/dL Final     GFR, ESTIMATED POCT 2025 >60  >60 mL/min/1.73m2 Final      Liver Function Studies -   Recent Labs   Lab Test 25  1251   PROTTOTAL 7.2   ALBUMIN 4.5   BILITOTAL 0.3   ALKPHOS 58   AST 34   ALT 25           Imagin25  CT Chest:    IMPRESSION:   1. No evidence of metastatic disease in the chest.  2. Multiple pulmonary nodules and area of round atelectasis are not  changed from prior.  MRI Liver  1. No evidence of metastatic disease.  2. 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 Recommend follow-up imaging in 6  months per guidelines detailed below.  3. Mild hepatic steatosis and hepatomegaly.  4. Bosniak II cyst in the right interpolar region. No routine  follow-up required.    Pathology:   Reviewed     Assessment and Plan:   Shira Abernathy is a 53 year old female with history of SVT s/p ablation, Celiac disease, hypothyroidism who presents for evaluation of Uveal Melanoma.     # Uveal Melanoma  -[id number], GEP Class 2, PRAME negative  -S/p enucleation  -Staging scans today negative for  metastatic disease  -We  again discussed that prognostically Class 2 GEP is high risk for distant metastasis with only 28% of patients being metastasis free at 5 years. We did discuss that PRAME negative could suggest a longer time to distant metastasis compared to those who are GEP class 2 and PRAME positive and that her prognosis may be closer to 58% metastasis free survival at 5 years (Alexandrea, JCO 2024).   -I recommend continued surveillance every 3-6 months for 5 years and then every 6-12 months for years 6-10 as per NCCN guidelines for uveal melanoma which are recently updated Jan 2025. Alternate CT C/A/P and CT Chest/MRI liver. Ok to do CT only if pt has cost issues.  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, repeat scans due in 6 months to re-evaluate    Celiac Disease:  -well controlled       Plan:  -return to clinic in 3 months with CT C/A/P and in person visit, labs  -if pt needs to move to virtual visit and local scans due to cost, she will let me know and we will work with her        Patti Cruz MD   of Medicine  Division of Hematology, Oncology and Transplantation      ---  30 minutes were spent on the date of the encounter performing chart review, history and exam, documentation, and further activities as noted above.          Again, thank you for allowing me to participate in the care of your patient.        Sincerely,        Patti Cruz MD    Electronically signed

## 2025-04-01 ENCOUNTER — TELEPHONE (OUTPATIENT)
Dept: OPHTHALMOLOGY | Facility: CLINIC | Age: 75
End: 2025-04-01

## 2025-04-01 NOTE — TELEPHONE ENCOUNTER
The pt's spouse Romaine called about making an appt. He doesn't just want an annual check. They are looking for Dr. Coon to put in a couple stitches in her eyelid. Please call Romaine back to discuss. Thanks.

## 2025-04-01 NOTE — TELEPHONE ENCOUNTER
Left Voicemail (1st Attempt) for the patient to call back and schedule the following:    Appointment type: return  Provider: dr. peralta  Return date: 10/2/2025  Specialty phone number: [phone]   Additonal Notes:  Return in about 1 year (around 10/2/2025).      [facility]   Orthopedics, Podiatry, Sports Medicine, Ent ,Eye , Audiology, Adult Endocrine & Diabetes, Nutrition & Medication Therapy Management Specialties   [facility]

## 2025-04-02 NOTE — TELEPHONE ENCOUNTER
Spouse called back to clarify. Pt has received her right prosthesis, but has been struggling with some ptosis of the RUL. They would like to do an in-clinic procedure to have this addressed. Did discuss she will have to have an evaluation first before treatment determination is made, but did schedule her in a procedure spot as they live 3+ hours away. Told her to hold on any supplements for 5 days prior to the procedure, no blood thinners.     Some swelling of the RUL and associated mucus and tearing. Did discuss if there is inflammation, might have to hold on procedure. Also discussed some insurance plans do require a PA for ptosis repair, but he states she is still under the [facility] charitable agreement for another couple of months. Did discuss it might need to be completed in hospital setting and not in clinic procedure.    Patti Ken COA 3:55 PM April 2, 2025

## 2025-04-02 NOTE — TELEPHONE ENCOUNTER
I called Reese, patient's  and LVM with your call back number. I'm not sure what they are talking about but I'm sure we can figure it out and do it same day. Can you please try to reach him again tomorrow if you don't get a call back first.  Thanks,  Alberta Medina, RN RN 3:08 PM 04/02/25

## 2025-04-03 NOTE — TELEPHONE ENCOUNTER
I agree with what was discussed with the patient, ptosis repair is primarily a hospital procedure if that is what they are referring to.

## 2025-04-29 ENCOUNTER — TELEPHONE (OUTPATIENT)
Dept: OPHTHALMOLOGY | Facility: CLINIC | Age: 75
End: 2025-04-29
Payer: MEDICARE

## 2025-04-29 NOTE — TELEPHONE ENCOUNTER
Spoke with patient's spouse regarding rescheduling current appointment for earlier on 4/30/25. Rescheduled patient as requested and patient is aware of new time.-Per Patient's Spouse

## 2025-04-30 ENCOUNTER — OFFICE VISIT (OUTPATIENT)
Dept: OPHTHALMOLOGY | Facility: CLINIC | Age: 75
End: 2025-04-30
Payer: MEDICARE

## 2025-04-30 DIAGNOSIS — C69.31 CHOROID MELANOMA OF RIGHT EYE (H): ICD-10-CM

## 2025-04-30 DIAGNOSIS — Z90.01 H/O ENUCLEATION OF RIGHT EYEBALL: Primary | ICD-10-CM

## 2025-04-30 DIAGNOSIS — H02.401 PTOSIS OF RIGHT EYELID: ICD-10-CM

## 2025-04-30 RX ORDER — LEVOTHYROXINE SODIUM 50 MCG
1 TABLET ORAL
COMMUNITY
Start: 2025-03-14

## 2025-04-30 RX ORDER — ERYTHROMYCIN 5 MG/G
OINTMENT OPHTHALMIC ONCE
Status: COMPLETED | OUTPATIENT
Start: 2025-04-30 | End: 2025-04-30

## 2025-04-30 RX ADMIN — ERYTHROMYCIN: 5 OINTMENT OPHTHALMIC at 15:09

## 2025-04-30 ASSESSMENT — EXTERNAL EXAM - LEFT EYE: OS_EXAM: NORMAL

## 2025-04-30 ASSESSMENT — VISUAL ACUITY
OD_CC: PROS
CORRECTION_TYPE: GLASSES
OS_CC: 20/50
METHOD: SNELLEN - LINEAR

## 2025-04-30 ASSESSMENT — SLIT LAMP EXAM - LIDS
COMMENTS: NORMAL
COMMENTS: PTOSIS

## 2025-04-30 ASSESSMENT — CONF VISUAL FIELD
OD_INFERIOR_NASAL_RESTRICTION: 1
OD_INFERIOR_TEMPORAL_RESTRICTION: 1
OD_SUPERIOR_NASAL_RESTRICTION: 1
OD_SUPERIOR_TEMPORAL_RESTRICTION: 1

## 2025-04-30 ASSESSMENT — TONOMETRY
OD_IOP_MMHG: PROS
IOP_METHOD: ICARE
OS_IOP_MMHG: 10

## 2025-04-30 ASSESSMENT — EXTERNAL EXAM - RIGHT EYE: OD_EXAM: PROSTHETIC

## 2025-04-30 NOTE — NURSING NOTE
Chief Complaints and History of Present Illnesses   Patient presents with    Droopy Right Upper Lid     Chief Complaint(s) and History of Present Illness(es)       Droopy Right Upper Lid              Laterality: right upper lid              Comments    Pt returns to the clinic with concerns of drooping of the RUL. S/P Right eye enucleation with implant 8/2/2024. Pt has since received her prosthesis, but would like to now address the ptosis of the lid as she had difficulty fully opening this eye. She would like to have better symmetry between her eyes.    Per the note from [facility], the [facility] recommends pt return to determine if a wedge implant would be beneficial as she has a sunken superior sulcus area and the socket sits low compared to the  eye.    She is hopeful for a same day procedure if appropriate as she is coming from [place]+ hours away.    Pt has not taken any supplements this week and is not on blood thinners. She is seeing oncologist Dr. Crzu.

## 2025-04-30 NOTE — PROGRESS NOTES
Chief Complaint(s) and History of Present Illness(es)     Droopy Right Upper Lid            Laterality: right upper lid          Comments    Pt returns to the clinic with concerns of drooping of the RUL. S/P Right   eye enucleation with implant 8/2/2024. Pt has since received her   prosthesis, but would like to now address the ptosis of the lid as she had   difficulty fully opening this eye. She would like to have better symmetry   between her eyes.    Per the note from [facility], the [facility]cular[facility] recommends pt return to   determine if a wedge implant would be beneficial as she has a sunken   superior sulcus area and the socket sits low compared to the    eye.    She is hopeful for a same day procedure if appropriate as she is coming   from 3+ hours away.    Pt has not taken any supplements this week and is not on blood thinners.   She is seeing oncologist Dr. Cruz.    Assessment & Plan     Shira Abernathy is a 74 year old female with the following diagnoses:     ICD-10-CM    1. H/O enucleation of right eyeball  Z90.01       2. Choroid melanoma of right eye (H)  C69.31         Sees Dr. Cruz with oncology at [facility], staging [id number] without metastatic disease on follow-up exam on 2/2025. They prefer to do it locally. . Shira Abernathy is about 9 months status post right enucleation for choroidal melanoma.  Here to address right eyelid asymmetry and ptosis with elevated right upper eyelid crease.      Christophe Gilmore MD  PGY-2 Ophthalmology Resident    Shira Abernathy is a 74-year-old female now status post enucleation for choroidal melanoma of the right eye.  Pathology is consistent with choroidal melanoma class II, PRAME negative.  She has been seen by oncology, Dr. Cruz and intends to have further follow-up done locally.  She had a prosthesis made.  The prosthesis could not be built large enough due to enophthalmos and blepharoptosis.  She was referred back to discuss this further.   We discussed in detail that she could use a bit more orbital volume to help fill the superior sulcus and tightening of the lower eyelid.  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  Her main concern, and that of her  is the blepharoptosis.  We did administer a drop of phenylephrine and they noted the improvement and felt this would be adequate and were not interested in any further orbital volume augmentation.  I did work to emphasize expectations as to what this would help achieve.  Given their driving distance they really would like to proceed with this today.  Again we discussed typically I would obtain prior authorization but they report they are paying for this through [facility] and would like to get it done today as it is not going through insurance.  We decided to proceed with a right upper eyelid posterior approach ptosis repair.    After discussing risks benefits and alternatives of the procedure including infection, bleeding, overcorrection, inability to close the eye, under correction, and expectation that she will have persistent enophthalmos and lower eyelid laxity, and that there is a potential for needing further procedures down the road, she elected to proceed.  These risks were discussed with the patient and her  who also agreed.    After obtaining informed consent, Shira was brought to the minor procedure room and laid supine on the table.  Her prosthesis was removed.  1% lidocaine with epinephrine was injected into the right upper eyelid.  She was prepped and draped in typical sterile fashion.  Attention was directed to the right side.  A 4-0 silk suture was placed through the upper eyelid margin and it was everted over a [place]marres retractor.  3 passes were made with a 6-0 silk suture about 4 mm superior to the superior tarsal plate.  The conjunctiva, Arora's muscle, levator aponeurosis was engaged within a clamp and a double-armed 6-0 plain  gut suture was passed starting laterally going nasally and then back laterally.  The excess tissue was excised with a 15 blade.  Hemostasis was assured with gentle pressure.  The suture was then externalized through the eyelid crease.  Prior to tying this down her prosthesis was replaced after being cleaned with Betadine.  The suture was tied in a typical fashion.  She tolerated the procedure well.  Erythromycin McCone it was applied.  She will take the same ointment home.  I was present for the entire procedure and performed the procedure. Shaggy Aguilar MD     Attending Physician Attestation: Complete documentation of historical and exam elements from today's encounter can be found in the full encounter summary report (not reduplicated in this progress note). I personally obtained the chief complaint(s) and history of present illness. I confirmed and edited as necessary the review of systems, past medical/surgical history, family history, social history, and examination findings as documented by others; and I examined the patient myself. I personally reviewed the relevant tests, images, and reports as documented above. I formulated and edited as necessary the assessment and plan and discussed the findings and management plan with the patient.  -Shaggy Aguilar MD

## 2025-04-30 NOTE — PATIENT INSTRUCTIONS
Use cold compresses for the first 48 hours while awake to minimize bruising and swelling. It works well to put a washcloth in a bowl of ice water and use the cold washcloth.     Use a warm compress 5-10 minutes 4 times per day for the next 2 days or as needed prior to next appointment.     Apply the prescribed/provided ointment to the incision 3 times a day for 5 days.

## 2025-04-30 NOTE — ADDENDUM NOTE
Addended by: DANIELA ALEJO on: 4/30/2025 04:46 PM     Modules accepted: Orders, Level of Service

## 2025-05-12 NOTE — PROGRESS NOTES
[facility]  Return Patient Visit    Name: Shira Abernathy  MRN: [id number]  Date of visit: May 13, 2025    Diagnosis: Uveal Melanoma  Stage: [id number]  Performance status: 1    Brief Summary:   Shira Abernathy is a 73 year old female with a history of uveal melanoma of the right eye presents after enucleation for evaluation.  The patient was seen by [facility] [place] in April 2024 and was noted to have a choroidal lesion which measured 4.2 mm x 12.1 mm x 11.2 mm which was highly suspicious for uveal melanoma.  In May 2024 she underwent a CT chest abdomen pelvis at [facility] which was negative for metastasis based on notes. We do not have a report or the images. She was seen again in July 2024 in which the mass had grown to 4.98mm (h)x 14.1mm (l) x 13.7mm (w).  A variety of local treatment options were discussed with the patient and she decided to undergo enucleation with Dr. Aguilar on August 2, 2024.  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 [facility] was sent which showed patient was GPEP class 2 and PRAME negative. Staging CT C/A/P was negative for metastasis. She has a stable pleural based RLL nodule and RUL nodule.    Interval history:   Shira is feeling well. She underwent a ptosis repair with Dr. Aguilar on 4/30. She stopped her synthroid for hypothyroidism in January in order to try naturopathic replacement. Her TSH increased to over 70 and she was restarted on synthroid with decline in the TSH.      Exam:   There were no vitals taken for this visit.    Gen: well appearing, R eye prosthesis in place  Neck: No LAD  Resp: CTAB  CV: RRR  Abd: Soft nt nd abdomen. +BS        Labs:   Ancillary Procedure on 05/13/2025   Component Date Value Ref Range Status    Creatinine POCT 05/13/2025  "1.1 (H)  0.5 - 1.0 mg/dL Final    GFR, ESTIMATED POCT 2025 52 (L)  >60 mL/min/1.73m2 Final      Lab Results   Component Value Date    AST 27 2025     Lab Results   Component Value Date    ALT 18 2025     No results found for: \"BILICONJ\"   Lab Results   Component Value Date    BILITOTAL 0.3 2025     Lab Results   Component Value Date    ALBUMIN 4.5 2025     Lab Results   Component Value Date    PROTTOTAL 7.0 2025      Lab Results   Component Value Date    ALKPHOS 52 2025              Imagin25  CT Chest:    IMPRESSION:   1. No evidence of metastatic disease in the chest.  2. Multiple pulmonary nodules and area of round atelectasis are not  changed from prior.  MRI Liver  1. No evidence of metastatic disease.  2. 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 Recommend follow-up imaging in 6  months per guidelines detailed below.  3. Mild hepatic steatosis and hepatomegaly.  4. Bosniak II cyst in the right interpolar region. No routine  follow-up required.    Pathology:   Reviewed     Assessment and Plan:   Shira Abernathy is a 53 year old female with history of SVT s/p ablation, Celiac disease, hypothyroidism who presents for evaluation of Uveal Melanoma.     # Uveal Melanoma  -[id number], GEP Class 2, PRAME negative  -S/p enucleation  -Staging scans today negative for metastatic disease on my evaluation, pending final radiology read  -We again discussed that prognostically Class 2 GEP is high risk for distant metastasis with only 28% of patients being metastasis free at 5 years. We did discuss that PRAME negative could suggest a longer time to distant metastasis compared to those who are GEP class 2 and PRAME positive and that her prognosis may be closer to 58% metastasis free survival at 5 years (SHELBIE Lechuga ).   -I recommend continued surveillance every 3-6 months for 5 years and then every 6-12 months for " years 6-10 as per NCCN guidelines for uveal melanoma which are recently updated Jan 2025. Alternate CT C/A/P and CT Chest/MRI liver. Ok to do CT only if pt has cost issues.  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, follow up on scans    Celiac Disease:  -well controlled    Hypothyroidism:  -TSH increased to over 70 after stopping synthroid in January 2025  -currently on synthroid 75mcg managed by provider in [place], TSH today 26 with normal free T4  -continue management in [place]       Plan:  -return to clinic in 4 months with CT C/A/P and in person visit, labs with Masha Yuan  -if pt needs to move to virtual visit and local scans due to cost, she will let me know and we will work with her        Patti Curz MD   of Medicine  Division of Hematology, Oncology and Transplantation      ---  30 minutes were spent on the date of the encounter performing chart review, history and exam, documentation, and further activities as noted above.

## 2025-05-13 ENCOUNTER — APPOINTMENT (OUTPATIENT)
Dept: LAB | Facility: CLINIC | Age: 75
End: 2025-05-13
Attending: HOSPITALIST
Payer: MEDICARE

## 2025-05-13 ENCOUNTER — ANCILLARY PROCEDURE (OUTPATIENT)
Dept: CT IMAGING | Facility: CLINIC | Age: 75
End: 2025-05-13
Attending: HOSPITALIST
Payer: MEDICARE

## 2025-05-13 ENCOUNTER — ONCOLOGY VISIT (OUTPATIENT)
Dept: ONCOLOGY | Facility: CLINIC | Age: 75
End: 2025-05-13
Attending: HOSPITALIST
Payer: MEDICARE

## 2025-05-13 VITALS
RESPIRATION RATE: 16 BRPM | WEIGHT: 144 LBS | DIASTOLIC BLOOD PRESSURE: 77 MMHG | OXYGEN SATURATION: 97 % | HEIGHT: 64 IN | TEMPERATURE: 98.7 F | HEART RATE: 67 BPM | BODY MASS INDEX: 24.59 KG/M2 | SYSTOLIC BLOOD PRESSURE: 122 MMHG

## 2025-05-13 DIAGNOSIS — C69.30 CHOROIDAL MALIGNANT MELANOMA (H): ICD-10-CM

## 2025-05-13 DIAGNOSIS — C69.31 CHOROID MELANOMA OF RIGHT EYE (H): ICD-10-CM

## 2025-05-13 DIAGNOSIS — E03.9 HYPOTHYROIDISM: ICD-10-CM

## 2025-05-13 DIAGNOSIS — C69.30 CHOROIDAL MALIGNANT MELANOMA (H): Primary | ICD-10-CM

## 2025-05-13 LAB
ALBUMIN SERPL BCG-MCNC: 4.5 G/DL (ref 3.5–5.2)
ALP SERPL-CCNC: 52 U/L (ref 40–150)
ALT SERPL W P-5'-P-CCNC: 18 U/L (ref 0–50)
ANION GAP SERPL CALCULATED.3IONS-SCNC: 10 MMOL/L (ref 7–15)
AST SERPL W P-5'-P-CCNC: 27 U/L (ref 0–45)
BILIRUB SERPL-MCNC: 0.3 MG/DL
BUN SERPL-MCNC: 26.8 MG/DL (ref 8–23)
CALCIUM SERPL-MCNC: 9.1 MG/DL (ref 8.8–10.4)
CHLORIDE SERPL-SCNC: 107 MMOL/L (ref 98–107)
CREAT BLD-MCNC: 1.1 MG/DL (ref 0.5–1)
CREAT SERPL-MCNC: 0.96 MG/DL (ref 0.51–0.95)
EGFRCR SERPLBLD CKD-EPI 2021: 52 ML/MIN/1.73M2
EGFRCR SERPLBLD CKD-EPI 2021: 61 ML/MIN/1.73M2
GLUCOSE SERPL-MCNC: 122 MG/DL (ref 70–99)
HCO3 SERPL-SCNC: 23 MMOL/L (ref 22–29)
POTASSIUM SERPL-SCNC: 4.3 MMOL/L (ref 3.4–5.3)
PROT SERPL-MCNC: 7 G/DL (ref 6.4–8.3)
SODIUM SERPL-SCNC: 140 MMOL/L (ref 135–145)
T4 FREE SERPL-MCNC: 1.24 NG/DL (ref 0.9–1.7)
TSH SERPL DL<=0.005 MIU/L-ACNC: 23.31 UIU/ML (ref 0.3–4.2)

## 2025-05-13 PROCEDURE — 84439 ASSAY OF FREE THYROXINE: CPT | Performed by: HOSPITALIST

## 2025-05-13 PROCEDURE — 82565 ASSAY OF CREATININE: CPT | Performed by: HOSPITALIST

## 2025-05-13 PROCEDURE — 74177 CT ABD & PELVIS W/CONTRAST: CPT | Performed by: RADIOLOGY

## 2025-05-13 PROCEDURE — 71260 CT THORAX DX C+: CPT | Performed by: RADIOLOGY

## 2025-05-13 PROCEDURE — G0463 HOSPITAL OUTPT CLINIC VISIT: HCPCS | Performed by: HOSPITALIST

## 2025-05-13 PROCEDURE — 84443 ASSAY THYROID STIM HORMONE: CPT | Performed by: HOSPITALIST

## 2025-05-13 PROCEDURE — 36415 COLL VENOUS BLD VENIPUNCTURE: CPT | Performed by: HOSPITALIST

## 2025-05-13 RX ORDER — HEPARIN SODIUM (PORCINE) LOCK FLUSH IV SOLN 100 UNIT/ML 100 UNIT/ML
5 SOLUTION INTRAVENOUS ONCE
Status: DISCONTINUED | OUTPATIENT
Start: 2025-05-13 | End: 2025-05-14 | Stop reason: HOSPADM

## 2025-05-13 RX ORDER — LEVOTHYROXINE SODIUM 75 MCG
75 TABLET ORAL
COMMUNITY
Start: 2025-04-24

## 2025-05-13 RX ORDER — IOPAMIDOL 755 MG/ML
79 INJECTION, SOLUTION INTRAVASCULAR ONCE
Status: COMPLETED | OUTPATIENT
Start: 2025-05-13 | End: 2025-05-13

## 2025-05-13 RX ADMIN — IOPAMIDOL 79 ML: 755 INJECTION, SOLUTION INTRAVASCULAR at 15:04

## 2025-05-13 ASSESSMENT — PAIN SCALES - GENERAL: PAINLEVEL_OUTOF10: NO PAIN (0)

## 2025-05-13 NOTE — DISCHARGE INSTRUCTIONS

## 2025-05-13 NOTE — LETTER
5/13/2025      Shira Abernathy  [address] [facility] [id number]      Dear Colleague,    Thank you for referring your patient, Shira Abernathy, to the [facility]. Please see a copy of my visit note below.    [facility]  Return Patient Visit    Name: Shira Abernathy  MRN: [id number]  Date of visit: May 13, 2025    Diagnosis: Uveal Melanoma  Stage: [id number]  Performance status: 1    Brief Summary:   Shira Abernathy is a 73 year old female with a history of uveal melanoma of the right eye presents after enucleation for evaluation.  The patient was seen by [facility] [place] in April 2024 and was noted to have a choroidal lesion which measured 4.2 mm x 12.1 mm x 11.2 mm which was highly suspicious for uveal melanoma.  In May 2024 she underwent a CT chest abdomen pelvis at [facility] in [place] which was negative for metastasis based on notes. We do not have a report or the images. She was seen again in July 2024 in which the mass had grown to 4.98mm (h)x 14.1mm (l) x 13.7mm (w).  A variety of local treatment options were discussed with the patient and she decided to undergo enucleation with Dr. Aguilar on August 2, 2024.  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 [facility] was sent which showed patient was GPEP class 2 and PRAME negative. Staging CT C/A/P was negative for metastasis. She has a stable pleural based RLL nodule and RUL nodule.    Interval history:   Shira is feeling well. She underwent a ptosis repair with Dr. Aguilar on 4/30. She stopped her synthroid for hypothyroidism in January in order to try naturopathic replacement. Her TSH increased to over 70 and she was restarted on synthroid with decline in the TSH.      Exam:   There were no vitals taken for  "this visit.    Gen: well appearing, R eye prosthesis in place  Neck: No LAD  Resp: CTAB  CV: RRR  Abd: Soft nt nd abdomen. +BS        Labs:   Ancillary Procedure on 2025   Component Date Value Ref Range Status     Creatinine POCT 2025 1.1 (H)  0.5 - 1.0 mg/dL Final     GFR, ESTIMATED POCT 2025 52 (L)  >60 mL/min/1.73m2 Final      Lab Results   Component Value Date    AST 27 2025     Lab Results   Component Value Date    ALT 18 2025     No results found for: \"BILICONJ\"   Lab Results   Component Value Date    BILITOTAL 0.3 2025     Lab Results   Component Value Date    ALBUMIN 4.5 2025     Lab Results   Component Value Date    PROTTOTAL 7.0 2025      Lab Results   Component Value Date    ALKPHOS 52 2025              Imagin25  CT Chest:    IMPRESSION:   1. No evidence of metastatic disease in the chest.  2. Multiple pulmonary nodules and area of round atelectasis are not  changed from prior.  MRI Liver  1. No evidence of metastatic disease.  2. 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 Recommend follow-up imaging in 6  months per guidelines detailed below.  3. Mild hepatic steatosis and hepatomegaly.  4. Bosniak II cyst in the right interpolar region. No routine  follow-up required.    Pathology:   Reviewed     Assessment and Plan:   Shira Abernathy is a 53 year old female with history of SVT s/p ablation, Celiac disease, hypothyroidism who presents for evaluation of Uveal Melanoma.     # Uveal Melanoma  -[id number], GEP Class 2, PRAME negative  -S/p enucleation  -Staging scans today negative for metastatic disease on my evaluation, pending final radiology read  -We again discussed that prognostically Class 2 GEP is high risk for distant metastasis with only 28% of patients being metastasis free at 5 years. We did discuss that PRAME negative could suggest a longer time to distant metastasis compared to " those who are GEP class 2 and PRAME positive and that her prognosis may be closer to 58% metastasis free survival at 5 years (Alexandrea, JCO 2024).   -I recommend continued surveillance every 3-6 months for 5 years and then every 6-12 months for years 6-10 as per NCCN guidelines for uveal melanoma which are recently updated Jan 2025. Alternate CT C/A/P and CT Chest/MRI liver. Ok to do CT only if pt has cost issues.  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, follow up on scans    Celiac Disease:  -well controlled    Hypothyroidism:  -TSH increased to over 70 after stopping synthroid in January 2025  -currently on synthroid 75mcg managed by provider in [place], TSH today 26 with normal free T4  -continue management in [place]       Plan:  -return to clinic in 4 months with CT C/A/P and in person visit, labs with Masha Yuan  -if pt needs to move to virtual visit and local scans due to cost, she will let me know and we will work with her        Patti Cruz MD   of Medicine  Division of Hematology, Oncology and Transplantation      ---  30 minutes were spent on the date of the encounter performing chart review, history and exam, documentation, and further activities as noted above.        Again, thank you for allowing me to participate in the care of your patient.        Sincerely,        Patti Cruz MD    Electronically signed

## 2025-05-13 NOTE — NURSING NOTE
"Oncology Rooming Note    May 13, 2025 3:46 PM   Shira Abernathy is a 74 year old female who presents for:    Chief Complaint   Patient presents with    Blood Draw     Initial Vitals: /77   Pulse 67   Temp 98.7  F (37.1  C) (Oral)   Resp 16   Ht 1.626 m (5' 4\")   Wt 65.3 kg (144 lb)   SpO2 97%   BMI 24.72 kg/m   Estimated body mass index is 24.72 kg/m  as calculated from the following:    Height as of this encounter: 1.626 m (5' 4\").    Weight as of this encounter: 65.3 kg (144 lb). Body surface area is 1.72 meters squared.  No Pain (0) Comment: Data Unavailable   No LMP recorded. Patient is postmenopausal.  Allergies reviewed: Yes  Medications reviewed: Yes    Medications: Medication refills not needed today.  Pharmacy name entered into [employer]: [facility] [address] [place] [address]    Frailty Screening:   Is the patient here for a new oncology consult visit in cancer care? 1. Yes. Over the past month, have you experienced difficulty or required a caregiver to assist with:   1. Balance, walking or general mobility (including any falls)? YES  2. Completion of self-care tasks such as bathing, dressing, toileting, grooming/hygiene?  YES  3. Concentration or memory that affects your daily life?  YES     PHQ9:  Did this patient require a PHQ9?: No      Clinical concerns: New pt shraddha.         Josefina Thacker CMA              "

## 2025-05-14 ENCOUNTER — OFFICE VISIT (OUTPATIENT)
Dept: OPHTHALMOLOGY | Facility: CLINIC | Age: 75
End: 2025-05-14
Payer: MEDICARE

## 2025-05-14 DIAGNOSIS — H02.401 PTOSIS OF RIGHT EYELID: ICD-10-CM

## 2025-05-14 DIAGNOSIS — C69.31 CHOROID MELANOMA OF RIGHT EYE (H): ICD-10-CM

## 2025-05-14 DIAGNOSIS — Z90.01 H/O ENUCLEATION OF RIGHT EYEBALL: Primary | ICD-10-CM

## 2025-05-14 ASSESSMENT — TONOMETRY
IOP_METHOD: ICARE
OS_IOP_MMHG: 9
OD_IOP_MMHG: PROS

## 2025-05-14 ASSESSMENT — VISUAL ACUITY
OD_CC: PROS
OS_CC: 20/40
METHOD: SNELLEN - LINEAR

## 2025-05-14 NOTE — NURSING NOTE
Chief Complaints and History of Present Illnesses   Patient presents with    Post Op (Ophthalmology) Right Eye     Chief Complaint(s) and History of Present Illness(es)       Post Op (Ophthalmology) Right Eye              Laterality: right eye              Comments    S/P ptosis repair right eye 4/30/2025. Pt feels that she is able to more fully open the right eye now. She also feels better symmetry between the eyes. Pt completed the erythromycin as directed.

## 2025-05-14 NOTE — PROGRESS NOTES
Chief Complaint(s) and History of Present Illness(es)     Post Op (Ophthalmology) Right Eye            Laterality: right eye          Comments    S/P ptosis repair right eye 4/30/2025. Pt feels that she is able to more   fully open the right eye now. She also feels better symmetry between the   eyes.     Assessment & Plan     Shira Abernathy is a 74 year old female with the following diagnoses:   Encounter Diagnoses   Name Primary?    H/O enucleation of right eyeball Yes    Choroid melanoma of right eye (H)     Ptosis of right eyelid      Healed very nicely post right upper eyelid ptosis repair  Followed by oncology locally  Undergoing left cataract surgery in July    I would like to see her back in 1 year         Patient disposition:   No follow-ups on file.        Attending Physician Attestation: Complete documentation of historical and exam elements from today's encounter can be found in the full encounter summary report (not reduplicated in this progress note). I personally obtained the chief complaint(s) and history of present illness. I confirmed and edited as necessary the review of systems, past medical/surgical history, family history, social history, and examination findings as documented by others; and I examined the patient myself. I personally reviewed the relevant tests, images, and reports as documented above. I formulated and edited as necessary the assessment and plan and discussed the findings and management plan with the patient.  -Shaggy Aguilar MD

## 2025-05-15 ENCOUNTER — TELEPHONE (OUTPATIENT)
Dept: ONCOLOGY | Facility: CLINIC | Age: 75
End: 2025-05-15
Payer: MEDICARE

## 2025-05-15 NOTE — TELEPHONE ENCOUNTER
I discussed scan results with Shira's . Lung findings are stable. I recommend repeating scans in the fall as planned.

## 2025-08-23 ENCOUNTER — HEALTH MAINTENANCE LETTER (OUTPATIENT)
Age: 75
End: 2025-08-23

## (undated) DEVICE — EYE PREP BETADINE 5% SOLUTION 30ML 0065-0411-30

## (undated) DEVICE — SOL WATER IRRIG 1000ML BOTTLE 2F7114

## (undated) DEVICE — NDL 30GA 1" 305128

## (undated) DEVICE — SU MONOCRYL 5-0 P-3 18" UND Y493G

## (undated) DEVICE — ESU CORD BIPOLAR 12' E0512

## (undated) DEVICE — SYR 03ML LL W/O NDL 309657

## (undated) DEVICE — SPONGE SURGIFOAM 50

## (undated) DEVICE — GLOVE BIOGEL PI MICRO SZ 7.5 48575

## (undated) DEVICE — SU PROLENE 5-0 RB-2DA 30" 8710H

## (undated) DEVICE — EYE CONFORMER MED S6.2231U

## (undated) DEVICE — PACK OCULOPLATIC SEN15OCFSD

## (undated) DEVICE — ADH LIQUID MASTISOL TOPICAL VIAL 2-3ML 0523-48

## (undated) DEVICE — NDL 25GA 1.5" 305127

## (undated) DEVICE — DECANTER VIAL 2006S

## (undated) DEVICE — EYE SHIELD PLASTIC

## (undated) DEVICE — SOL NACL 0.9% IRRIG 1000ML BOTTLE 2F7124

## (undated) DEVICE — EYE DRSG PAD OVAL

## (undated) DEVICE — DECANTER BAG 2002S

## (undated) RX ORDER — GLYCOPYRROLATE 0.2 MG/ML
INJECTION, SOLUTION INTRAMUSCULAR; INTRAVENOUS
Status: DISPENSED
Start: 2024-08-02

## (undated) RX ORDER — ONDANSETRON 2 MG/ML
INJECTION INTRAMUSCULAR; INTRAVENOUS
Status: DISPENSED
Start: 2024-08-02

## (undated) RX ORDER — FENTANYL CITRATE 50 UG/ML
INJECTION, SOLUTION INTRAMUSCULAR; INTRAVENOUS
Status: DISPENSED
Start: 2024-08-02

## (undated) RX ORDER — EPHEDRINE SULFATE 50 MG/ML
INJECTION, SOLUTION INTRAMUSCULAR; INTRAVENOUS; SUBCUTANEOUS
Status: DISPENSED
Start: 2024-08-02

## (undated) RX ORDER — OXYCODONE HYDROCHLORIDE 5 MG/1
TABLET ORAL
Status: DISPENSED
Start: 2024-08-02

## (undated) RX ORDER — CEFAZOLIN SODIUM/WATER 2 G/20 ML
SYRINGE (ML) INTRAVENOUS
Status: DISPENSED
Start: 2024-08-02